# Patient Record
Sex: FEMALE | Race: OTHER | NOT HISPANIC OR LATINO | Employment: UNEMPLOYED | URBAN - METROPOLITAN AREA
[De-identification: names, ages, dates, MRNs, and addresses within clinical notes are randomized per-mention and may not be internally consistent; named-entity substitution may affect disease eponyms.]

---

## 2019-04-29 ENCOUNTER — APPOINTMENT (OUTPATIENT)
Dept: URGENT CARE | Facility: CLINIC | Age: 59
End: 2019-04-29

## 2019-04-29 ENCOUNTER — OCCUPATIONAL MEDICINE (OUTPATIENT)
Dept: URGENT CARE | Facility: CLINIC | Age: 59
End: 2019-04-29
Payer: COMMERCIAL

## 2019-04-29 VITALS
HEART RATE: 74 BPM | RESPIRATION RATE: 16 BRPM | BODY MASS INDEX: 21.97 KG/M2 | SYSTOLIC BLOOD PRESSURE: 128 MMHG | WEIGHT: 140 LBS | HEIGHT: 67 IN | DIASTOLIC BLOOD PRESSURE: 88 MMHG | TEMPERATURE: 98 F | OXYGEN SATURATION: 98 %

## 2019-04-29 DIAGNOSIS — S29.019A STRAIN OF THORACIC SPINE, INITIAL ENCOUNTER: ICD-10-CM

## 2019-04-29 DIAGNOSIS — Z02.1 PRE-EMPLOYMENT DRUG SCREENING: ICD-10-CM

## 2019-04-29 LAB
BREATH ALCOHOL COMMENT: NORMAL
POC BREATHALIZER: 0 PERCENT (ref 0–0.01)

## 2019-04-29 PROCEDURE — 80305 DRUG TEST PRSMV DIR OPT OBS: CPT | Mod: 29 | Performed by: PHYSICIAN ASSISTANT

## 2019-04-29 PROCEDURE — 82075 ASSAY OF BREATH ETHANOL: CPT | Mod: 29 | Performed by: PHYSICIAN ASSISTANT

## 2019-04-29 PROCEDURE — 99214 OFFICE O/P EST MOD 30 MIN: CPT | Mod: 29 | Performed by: PHYSICIAN ASSISTANT

## 2019-04-29 RX ORDER — METHOCARBAMOL 750 MG/1
750 TABLET, FILM COATED ORAL 4 TIMES DAILY
Qty: 30 TAB | Refills: 0 | Status: SHIPPED | OUTPATIENT
Start: 2019-04-29 | End: 2020-05-19

## 2019-04-29 ASSESSMENT — ENCOUNTER SYMPTOMS
FALLS: 0
TINGLING: 0

## 2019-04-29 NOTE — LETTER
"EMPLOYEE’S CLAIM FOR COMPENSATION/ REPORT OF INITIAL TREATMENT  FORM C-4    EMPLOYEE’S CLAIM - PROVIDE ALL INFORMATION REQUESTED   First Name  Alida Last Name  Filiberto Birthdate                    1960                Sex  female Claim Number   Home Address  [address] Age  59 y.o. Height  1.702 m (5' 7\") Weight  63.5 kg (140 lb) [facility]     [facility] [place]  [address] Telephone  [phone] (home)    Mailing Address  [address] [facility] [place]  [address] Primary Language Spoken  English    Insurer   Third Party   Greta Rush   Employee's Occupation (Job Title) When Injury or Occupational Disease Occurred      Employer's Name  [employer]  Telephone  [phone]    Employer Address  [address]  [place]  Zip  [address]    Date of Injury  4/25/2019               Hour of Injury  12:00 PM Date Employer Notified  4/29/2019 Last Day of Work after Injury or Occupational Disease  4/29/2019 Supervisor to Whom Injury Reported  Edmond/Ju   Address or Location of Accident (if applicable)  [[address] ]   What were you doing at the time of accident? (if applicable)  Lifting 40 lbs box    How did this injury or occupational disease occur? (Be specific an answer in detail. Use additional sheet if necessary)  boxx at bottom of crate,lifted it back (upper right) starting stabing pain   If you believe that you have an occupational disease, when did you first have knowledge of the disability and it relationship to your employment?  n/a Witnesses to the Accident  Co workers      Nature of Injury or Occupational Disease  Workers' Compensation  Part(s) of Body Injured or Affected  Upper Back Area (Thoracic Area), Abdomen Including Groin,     I certify that the above is true and correct to the best of my knowledge and that I have provided this " information in order to obtain the benefits of [place]  I hereby authorize any physician, chiropractor, surgeon, practitioner, or other person, any hospital, including [facility], any medical service organization, any insurance company, or other institution or organization to release to each other, any medical or other information, including benefits paid or payable, pertinent to this injury or disease, except information relative to diagnosis, treatment and/or counseling for AIDS, psychological conditions, alcohol or controlled substances, for which I must give specific authorization.  A Photostat of this authorization shall be as valid as the original.     Date 4/29/19   Place [facility]   Employee’s Signature   THIS REPORT MUST BE COMPLETED AND MAILED WITHIN 3 WORKING DAYS OF TREATMENT   Place  [facility]  Name of Facility  [facility]   Date  4/29/2019 Diagnosis  (S29.019A) Strain of thoracic spine, initial encounter Is there evidence the injured employee was under the influence of alcohol and/or another controlled substance at the time of accident?   Hour  4:57 PM Description of Injury or Disease  The encounter diagnosis was Strain of thoracic spine, initial encounter. No   Treatment  Rest, NSAIDs, heat, stretching.  Muscle relaxers as needed.  Have you advised the patient to remain off work five days or more? No   X-Ray Findings      If Yes   From Date  To Date      From information given by the employee, together with medical evidence, can you directly connect this injury or occupational disease as job incurred?  Yes If No Full Duty  No Modified Duty  Yes   Is additional medical care by a physician indicated?  Yes If Modified Duty, Specify any Limitations / Restrictions  No use of right upper extremity.  No lifting loads heavier than 10 pounds.   "No stooping, bending, pushing, pulling, or climbing.   Do you know of any previous injury or disease contributing to this condition or occupational disease?                            No   Date  4/29/2019 Print Doctor’s Name Malaika Urrutia P.A.-C. I certify the employer’s copy of  this form was mailed on:   Address  [address] Insurer’s Use Only     [place] Zip  [address]    Provider’s Tax ID Number  [id number] Telephone  Dept: [phone]        e-MALAIKA Camilo P.A.-C.   e-Signature: Dr. Shimon Catalan, Medical Director Degree           ORIGINAL-TREATING PHYSICIAN OR CHIROPRACTOR    PAGE 2-INSURER/TPA    PAGE 3-EMPLOYER    PAGE 4-EMPLOYEE             Form C-4 (rev10/07)              BRIEF DESCRIPTION OF RIGHTS AND BENEFITS  (Pursuant to [place]RS 616C.050)    Notice of Injury or Occupational Disease (Incident Report Form C-1): If an injury or occupational disease (OD) arises out of and in the  course of employment, you must provide written notice to your employer as soon as practicable, but no later than 7 days after the accident or  OD. Your employer shall maintain a sufficient supply of the required forms.    Claim for Compensation (Form C-4): If medical treatment is sought, the form C-4 is available at the place of initial treatment. A completed  \"Claim for Compensation\" (Form C-4) must be filed within 90 days after an accident or OD. The treating physician or chiropractor must,  within 3 working days after treatment, complete and mail to the employer, the employer's insurer and third-party , the Claim for  Compensation.    Medical Treatment: If you require medical treatment for your on-the-job injury or OD, you may be required to select a physician or  chiropractor from a list provided by your workers’ compensation insurer, if it has contracted with an Organization for Managed Care (MCO) or  Preferred Provider Organization (PPO) or providers of health care. If your employer has " not entered into a contract with an MCO or PPO, you  may select a physician or chiropractor from the Panel of Physicians and Chiropractors. Any medical costs related to your industrial injury or  OD will be paid by your insurer.    Temporary Total Disability (TTD): If your doctor has certified that you are unable to work for a period of at least 5 consecutive days, or 5  cumulative days in a 20-day period, or places restrictions on you that your employer does not accommodate, you may be entitled to TTD  compensation.    Temporary Partial Disability (TPD): If the wage you receive upon reemployment is less than the compensation for TTD to which you are  entitled, the insurer may be required to pay you TPD compensation to make up the difference. TPD can only be paid for a maximum of 24  months.    Permanent Partial Disability (PPD): When your medical condition is stable and there is an indication of a PPD as a result of your injury or  OD, within 30 days, your insurer must arrange for an evaluation by a rating physician or chiropractor to determine the degree of your PPD. The  amount of your PPD award depends on the date of injury, the results of the PPD evaluation and your age and wage.    Permanent Total Disability (PTD): If you are medically certified by a treating physician or chiropractor as permanently and totally disabled  and have been granted a PTD status by your insurer, you are entitled to receive monthly benefits not to exceed 66 2/3% of your average  monthly wage. The amount of your PTD payments is subject to reduction if you previously received a PPD award.    Vocational Rehabilitation Services: You may be eligible for vocational rehabilitation services if you are unable to return to the job due to a  permanent physical impairment or permanent restrictions as a result of your injury or occupational disease.    Transportation and Per Sae Reimbursement: You may be eligible for travel expenses and per sae  associated with medical treatment.    Reopening: You may be able to reopen your claim if your condition worsens after claim closure.    Appeal Process: If you disagree with a written determination issued by the insurer or the insurer does not respond to your request, you may  appeal to the Department of Administration, , by following the instructions contained in your determination letter. You must  appeal the determination within 70 days from the date of the determination letter at [address], [address], [place], [place]  [address], or [address], [place], [place] [address]. If you disagree with the  decision, you may appeal to the  Department of Administration, . You must file your appeal within 30 days from the date of the  decision  letter at [address], [address], [place], [place] [address], or [address], [place], [place] [address]. If you  disagree with a decision of an , you may file a petition for judicial review with the District Court. You must do so within 30  days of the Appeal Officer’s decision. You may be represented by an  at your own expense or you may contact the [facility] for possible  representation.    [place]  for Injured Workers (NAIW): If you disagree with a  decision, you may request that NAIW represent you  without charge at an  Hearing. For information regarding denial of benefits, you may contact the [facility] at: [address], [place] [address], [phone], or [address], [place] [address], [phone]    To File a Complaint with the Division: If you wish to file a complaint with the  of the Division of Industrial Relations (DIR),  please contact the Workers’ Compensation Section, [address], [address], [place], [place] [address], telephone  ([phone], or  [address], [address], [place], [place] [address], telephone [phone].    For assistance with Workers’ Compensation Issues: you may contact the Office of the Governor Consumer Health Assistance, [address] [place] [address], [address], [place], [place] [address], Toll Free 1-477.182.6854, Web site: [email or web address]., E-mail  [email or web address].                                                                                                                                                                                                                                   __________________________________________________________________                                                                   _____4/29/19____________                Employee Name / Signature                                                                                                                                                       Date                                                                                                                                                                                                     D-2 (rev. 10/07)

## 2019-04-29 NOTE — LETTER
[facility]  [address] [address]  Phone:  [phone] - Fax:  [phone]   [facility] Progress Report and Disability Certification  Date of Service: 4/29/2019   No Show:  No  Date / Time of Next Visit: 5/3/2019   Claim Information   Patient Name: Alida De Los Santos  Claim Number:     Employer: [employer]  Date of Injury: 4/25/2019     Insurer / TPA: Greta [place]  ID / SSN:     Occupation:   Diagnosis: The encounter diagnosis was Strain of thoracic spine, initial encounter.    Medical Information   Related to Industrial Injury?      Subjective Complaints:  Date of Injury:  4/25/2019. Pt complains of right upper back pain x 7 days.  Symptoms began when she was moving a 40 pound box off the bottom of a crate.  She felt a sudden sharp pulling in her right shoulder.  Pain is not present at rest, but is sharp with movement.  She states that she was seen in a different urgent care several days ago and given an injection of Toradol and started on oral Toradol.  She is developing a rash with this medication.  She does not tolerate other oral NSAIDs as she has had GI bleeding in the past.  She has applied heat with significant relief.  She denies distal numbness and tingling, weakness, restricted range of motion.  She is right-hand dominant.  Denies prior injury to the affected area.   Objective Findings: No midline bony tenderness.  No step-offs or deformities.  Right thoracic spine paraspinals are acutely tender to palpation with palpable spasm.  Right shoulder range of motion is within normal limits.  Cervical spine range of motion within normal limits.   Neurological: She is alert. No sensory deficit.   Neurovascularly intact distally.   Skin: Skin is warm and dry. Capillary refill takes less than 2 seconds.    Pre-Existing Condition(s):     Assessment:   Initial Visit    Status: Additional Care Required  Permanent  Disability:     Plan: MedicationMedication (NOT at Work)  Comments:topical diclofenac, APAP, robaxin    Diagnostics:      Comments:  Topical diclofenac and Tylenol as needed for pain.  Robaxin for muscle spasm.  Apply heat and perform gentle stretches as instructed in clinic.  Return to clinic in 4 days for reevaluation.    Disability Information   Status: Released to Restricted Duty    From:  4/29/2019  Through: 5/3/2019 Restrictions are:     Physical Restrictions   Sitting:    Standing:    Stooping:    Bending:      Squatting:    Walking:    Climbing:    Pushing:      Pulling:    Other:    Reaching Above Shoulder (L):   Reaching Above Shoulder (R):       Reaching Below Shoulder (L):    Reaching Below Shoulder (R):      Not to exceed Weight Limits   Carrying(hrs):   Weight Limit(lb): < or = to 10 pounds Lifting(hrs):   Weight  Limit(lb): < or = to 10 pounds   Comments: No use of right upper extremity except for activities of daily living.  No lifting loads heavier than 10 pounds.  No stooping, bending, pushing, pulling, or climbing.    Repetitive Actions   Hands: i.e. Fine Manipulations from Grasping:     Feet: i.e. Operating Foot Controls:     Driving / Operate Machinery:     Physician Name: Malaika Urrutia P.A.-C. Physician Signature: MALAIKA Parker P.A.-C. e-Signature: Dr. Shimon Catalan, Medical Director   Clinic Name / Location: [address], [address]  [facility] [place] [address] Clinic Phone Number: Dept: [phone]   Appointment Time: 4:30 Pm Visit Start Time: 4:57 PM   Check-In Time:  4:34 Pm Visit Discharge Time: 5:14 Pm    Original-Treating Physician or Chiropractor    Page 2-Insurer/TPA    Page 3-Employer    Page 4-Employee

## 2019-04-30 LAB
AMP AMPHETAMINE: NORMAL
COC COCAINE: NORMAL
INT CON NEG: NORMAL
INT CON POS: NORMAL
MET METHAMPHETAMINES: NORMAL
OPI OPIATES: NORMAL
PCP PHENCYCLIDINE: NORMAL
POC DRUG COMMENT 753798-OCCUPATIONAL HEALTH: NORMAL
THC: NORMAL

## 2019-04-30 NOTE — PROGRESS NOTES
Subjective:   Alida De Los Santos is a 59 y.o. female who presents for Back Pain (WC New, Lift injury, upper back pain)        Date of Injury:  4/25/2019. Pt complains of right upper back pain x 7 days.  Symptoms began when she was moving a 40 pound box off the bottom of a crate.  She felt a sudden sharp pulling in her right shoulder.  Pain is not present at rest, but is sharp with movement.  She states that she was seen in a different urgent care several days ago and given an injection of Toradol and started on oral Toradol.  She is developing a rash with this medication.  She does not tolerate other oral NSAIDs as she has had GI bleeding in the past.  She has applied heat with significant relief.  She denies distal numbness and tingling, weakness, restricted range of motion.  She is right-hand dominant.  Denies prior injury to the affected area.                    Review of Systems   Musculoskeletal: Positive for joint pain. Negative for falls.   Neurological: Negative for tingling.       PMH:  has a past medical history of Allergy, unspecified not elsewhere classified; Degenerative disc disease; Depression; and Thyroid disease. She also has no past medical history of GERD (gastroesophageal reflux disease); IBD (inflammatory bowel disease); Kidney disease; or Ulcer.  MEDS:   Current Outpatient Prescriptions:   •  Diclofenac Sodium 1 % Gel, Apply 2 g to skin as directed 2 Times a Day., Disp: 1 Tube, Rfl: 0  •  methocarbamol (ROBAXIN) 750 MG Tab, Take 1 Tab by mouth 4 times a day., Disp: 30 Tab, Rfl: 0  •  Levothyroxine Sodium (SYNTHROID PO), Take  by mouth., Disp: , Rfl:   •  Vortioxetine HBr (BRINTELLIX) 10 MG TABS, Take  by mouth., Disp: , Rfl:   •  methylPREDNISolone (MEDROL DOSPACK) 4 MG TABS, follow package directions, Disp: 1 Tab, Rfl: 0  •  fluticasone (FLONASE) 50 MCG/ACT nasal spray, Spray 2 Sprays in nose every day., Disp: 16 g, Rfl: 0  •  LITHIUM CARBONATE PO, Take  by mouth., Disp: , Rfl:   •  lorazepam  "(ATIVAN) 1 MG TABS, Take 1 mg by mouth every four hours as needed., Disp: , Rfl:   •  fluticasone (FLONASE) 50 MCG/ACT nasal spray, Spray 2 Sprays in nose every day. Each Nostril, Disp: 16 g, Rfl: 3  ALLERGIES:   Allergies   Allergen Reactions   • Codeine    • Nexium      SURGHX: No past surgical history on file.  SOCHX:  reports that she has never smoked. She has never used smokeless tobacco. She reports that she does not drink alcohol or use drugs.  FH: Family history was reviewed, no pertinent findings to report   Objective:   /88   Pulse 74   Temp 36.7 °C (98 °F) (Temporal)   Resp 16   Ht 1.702 m (5' 7\")   Wt 63.5 kg (140 lb)   SpO2 98%   BMI 21.93 kg/m²   Physical Exam   Constitutional: She appears well-developed and well-nourished.  Non-toxic appearance. No distress.   HENT:   Head: Normocephalic and atraumatic.   Right Ear: External ear normal.   Left Ear: External ear normal.   Nose: Nose normal.   Neck: Neck supple.   Cardiovascular: Normal rate and regular rhythm.    Pulses:       Radial pulses are 2+ on the right side.   Pulmonary/Chest: Effort normal. No respiratory distress.   Musculoskeletal:        Arms:  No midline bony tenderness.  No step-offs or deformities.  Right thoracic spine paraspinals are acutely tender to palpation with palpable spasm.  Right shoulder range of motion is within normal limits.  Cervical spine range of motion within normal limits.   Neurological: She is alert. No sensory deficit.   Neurovascularly intact distally.   Skin: Skin is warm and dry. Capillary refill takes less than 2 seconds.   Psychiatric: She has a normal mood and affect. Her speech is normal and behavior is normal. Judgment and thought content normal. Cognition and memory are normal.   Vitals reviewed.        Assessment/Plan:   1. Strain of thoracic spine, initial encounter  - Diclofenac Sodium 1 % Gel; Apply 2 g to skin as directed 2 Times a Day.  Dispense: 1 Tube; Refill: 0  - methocarbamol " (ROBAXIN) 750 MG Tab; Take 1 Tab by mouth 4 times a day.  Dispense: 30 Tab; Refill: 0     No red flag symptoms.  No bony tenderness or significant trauma.  Radiological imaging not indicated at this time.    Patient instructed to rest and avoid aggravating activities.  Apply warm, damp heat to the affected area and perform gentle stretches as instructed in clinic.  As symptoms improve patient may increase activity.    Topical diclofenac and Tylenol as needed.  Robaxin for muscle spasm.  Heat and gentle stretching    If symptoms worsen or fail to improve patient instructed to follow-up with PCP or return to clinic for reevaluation.  If patient develops red flag symptoms such as urinary retention, loss of bowel or bladder control, perianal numbness or tingling, lower extremity weakness-patient was instructed to go to the ED for further evaluation.    Differential diagnosis, natural history, supportive care, and indications for immediate follow-up discussed.

## 2019-05-03 ENCOUNTER — OCCUPATIONAL MEDICINE (OUTPATIENT)
Dept: URGENT CARE | Facility: CLINIC | Age: 59
End: 2019-05-03
Payer: COMMERCIAL

## 2019-05-03 VITALS
BODY MASS INDEX: 28.82 KG/M2 | SYSTOLIC BLOOD PRESSURE: 130 MMHG | HEIGHT: 67 IN | HEART RATE: 90 BPM | WEIGHT: 183.6 LBS | RESPIRATION RATE: 16 BRPM | OXYGEN SATURATION: 98 % | TEMPERATURE: 98.7 F | DIASTOLIC BLOOD PRESSURE: 84 MMHG

## 2019-05-03 DIAGNOSIS — S29.019D ACUTE THORACIC MYOFASCIAL STRAIN, SUBSEQUENT ENCOUNTER: ICD-10-CM

## 2019-05-03 PROCEDURE — 99213 OFFICE O/P EST LOW 20 MIN: CPT | Performed by: EMERGENCY MEDICINE

## 2019-05-03 RX ORDER — CELECOXIB 200 MG/1
200 CAPSULE ORAL
Qty: 14 CAP | Refills: 0 | Status: SHIPPED | OUTPATIENT
Start: 2019-05-03 | End: 2019-05-10

## 2019-05-03 NOTE — PROGRESS NOTES
Subjective:      Alida De Los Santos is a 59 y.o. female who presents with Back Pain (MId back pain)      Date of injury: 04/25/2019.  Injured at work: yes; onset of right periscapular pain while lifting a box. Previous injury: none. Second job: none. Outside activity: none. Contributing medical illness: none. Severity: moderate, severe. Prior treatment:  Heat, topical NSAID, muscle relaxant, occasional Tylenol. Location: right upper back. Radiation: Right shoulder, right torso. Numbness/tingling: none. Focal weakness: none.     HPI    ROS  PMH:  has a past medical history of Allergy, unspecified not elsewhere classified; Degenerative disc disease; Depression; and Thyroid disease. She also has no past medical history of GERD (gastroesophageal reflux disease); IBD (inflammatory bowel disease); Kidney disease; or Ulcer.  MEDS:   Current Outpatient Prescriptions:   •  Levothyroxine Sodium (SYNTHROID PO), Take  by mouth., Disp: , Rfl:   •  fluticasone (FLONASE) 50 MCG/ACT nasal spray, Spray 2 Sprays in nose every day. Each Nostril, Disp: 16 g, Rfl: 3  •  Diclofenac Sodium 1 % Gel, Apply 2 g to skin as directed 2 Times a Day., Disp: 1 Tube, Rfl: 0  •  methocarbamol (ROBAXIN) 750 MG Tab, Take 1 Tab by mouth 4 times a day., Disp: 30 Tab, Rfl: 0  •  Vortioxetine HBr (BRINTELLIX) 10 MG TABS, Take  by mouth., Disp: , Rfl:   •  methylPREDNISolone (MEDROL DOSPACK) 4 MG TABS, follow package directions, Disp: 1 Tab, Rfl: 0  •  fluticasone (FLONASE) 50 MCG/ACT nasal spray, Spray 2 Sprays in nose every day., Disp: 16 g, Rfl: 0  •  LITHIUM CARBONATE PO, Take  by mouth., Disp: , Rfl:   •  lorazepam (ATIVAN) 1 MG TABS, Take 1 mg by mouth every four hours as needed., Disp: , Rfl:   ALLERGIES:   Allergies   Allergen Reactions   • Codeine    • Nexium      SURGHX: History reviewed. No pertinent surgical history.  SOCHX:  reports that she has never smoked. She has never used smokeless tobacco. She reports that she does not drink alcohol or  "use drugs.  FH: family history includes Non-contributory in her father and mother.       Objective:     /84 (BP Location: Left arm, Patient Position: Sitting, BP Cuff Size: Large adult)   Pulse 90   Temp 37.1 °C (98.7 °F) (Temporal)   Resp 16   Ht 1.702 m (5' 7\")   Wt 83.3 kg (183 lb 9.6 oz)   SpO2 98%   BMI 28.76 kg/m²      Physical Exam    General: Alert, cooperative, no acute distress.  Torso: Normal respiratory effort, no rib tenderness.  Musculoskeletal: Right upper thoracic, right medial periscapular muscle tenderness, focal spasm.  Right shoulder rotator cuff function intact, no joint tenderness.  Vascular: Bilateral radial pulses intact.  Neurological: Bilateral upper extremity motor, sensory function intact.  Skin: Warm, dry, intact       Assessment/Plan:     1. Acute thoracic myofascial strain, subsequent encounter  D39 completed.  Discontinue muscle relaxant, topical NSAID.  Advised alternating ice/heat, OTC topical analgesic patch as needed.  Rx Mobic.      "

## 2019-05-03 NOTE — LETTER
[facility]  [address], [address] [address]  Phone:  [phone] - Fax:  [phone]   [facility] Progress Report and Disability Certification  Date of Service: 5/3/2019   No Show:  No  Date / Time of Next Visit: 5/10/2019   Claim Information   Patient Name: Alida De Los Santos  Claim Number:     Employer: [employer]  Date of Injury: 4/25/2019     Insurer / TPA: Greta Bolivar  ID / SSN:     Occupation:   Diagnosis: The encounter diagnosis was Acute thoracic myofascial strain, subsequent encounter.    Medical Information   Related to Industrial Injury? Yes    Subjective Complaints:  Date of injury: 04/25/2019.  Injured at work: yes; onset of right periscapular pain while lifting a box. Previous injury: none. Second job: none. Outside activity: none. Contributing medical illness: none. Severity: moderate, severe. Prior treatment:  Heat, topical NSAID, muscle relaxant, occasional Tylenol. Location: right upper back. Radiation: Right shoulder, right torso. Numbness/tingling: none. Focal weakness: none.   Objective Findings: General: Alert, cooperative, no acute distress.  Torso: Normal respiratory effort, no rib tenderness.  Musculoskeletal: Right upper thoracic, right medial periscapular muscle tenderness, focal spasm.  Right shoulder rotator cuff function intact, no joint tenderness.  Vascular: Bilateral radial pulses intact.  Neurological: Bilateral upper extremity motor, sensory function intact.  Skin: Warm, dry, intact   Pre-Existing Condition(s):     Assessment:   Condition Same    Status: Additional Care Required  Permanent Disability:No    Plan: Medication    Diagnostics:      Comments:       Disability Information   Status: Released to Restricted Duty    From:  5/3/2019  Through: 5/10/2019 Restrictions are: Temporary   Physical Restrictions   Sitting:    Standing:    Stooping:    Bending:      Squatting:    Walking:   Climbing:    Pushing:    Comments:Left arm only   Pulling:    Comments:Left arm only Other:    Reaching Above Shoulder (L):   Reaching Above Shoulder (R): 0 hrs/day     Reaching Below Shoulder (L):    Reaching Below Shoulder (R):  < or = to 1 hrs/day   Not to exceed Weight Limits   Carrying(hrs):   Weight Limit(lb): < or = to 10 pounds Lifting(hrs):   Weight  Limit(lb): < or = to 10 pounds   Comments:      Repetitive Actions   Hands: i.e. Fine Manipulations from Grasping:     Feet: i.e. Operating Foot Controls:     Driving / Operate Machinery:     Physician Name: Gavin Wheeler M.D. Physician Signature: GAVIN Deleon M.D. e-Signature: Dr. Shimon Catalan, Medical Director   Clinic Name / Location: [address], [address] [place] [address] Clinic Phone Number: Dept: [phone]   Appointment Time: 11:00 Am Visit Start Time: 10:16 AM   Check-In Time:  10:12 Am Visit Discharge Time:  11:05am   Original-Treating Physician or Chiropractor    Page 2-Insurer/TPA    Page 3-Employer    Page 4-Employee

## 2019-05-10 ENCOUNTER — OCCUPATIONAL MEDICINE (OUTPATIENT)
Dept: URGENT CARE | Facility: CLINIC | Age: 59
End: 2019-05-10
Payer: COMMERCIAL

## 2019-05-10 VITALS
OXYGEN SATURATION: 98 % | WEIGHT: 181 LBS | HEART RATE: 72 BPM | RESPIRATION RATE: 16 BRPM | SYSTOLIC BLOOD PRESSURE: 134 MMHG | BODY MASS INDEX: 28.41 KG/M2 | DIASTOLIC BLOOD PRESSURE: 82 MMHG | TEMPERATURE: 98 F | HEIGHT: 67 IN

## 2019-05-10 DIAGNOSIS — S29.019D ACUTE THORACIC MYOFASCIAL STRAIN, SUBSEQUENT ENCOUNTER: ICD-10-CM

## 2019-05-10 PROCEDURE — 99214 OFFICE O/P EST MOD 30 MIN: CPT | Mod: 29 | Performed by: PHYSICIAN ASSISTANT

## 2019-05-10 ASSESSMENT — ENCOUNTER SYMPTOMS
BACK PAIN: 1
MYALGIAS: 1
TINGLING: 0
FOCAL WEAKNESS: 0
SENSORY CHANGE: 0

## 2019-05-10 NOTE — PROGRESS NOTES
"Subjective:      Alida De Los Santos is a 59 y.o. female who presents with Employment Physical    Pt PMH, SocHx, SurgHx, FamHx, Drug allergies and medications reviewed with pt/EPIC.      Family history reviewed, it is not pertinent to this complaint.     DOI: 4/25/2019.  Patient presents clinic today for follow-up evaluation of right upper back strain that occurred while lifting a 40 pound box at work.  Patient states she is slowly improving over the course of the last week.  Patient states pain was 8 out of the 10 in the beginning now is 6 out of a 10.  Patient states she has been able to raise her range of motion in her shoulder while doing her exercises at home.  Patient has been doing over-the-counter NSAIDs as well as topical salon pas, heat and cool packs as well.  Patient denies numbness or tingling to right hand.  Denies previous injury to this area.  Denies second job.  Patient is right-handed.     Work comp follow up.         Review of Systems   Musculoskeletal: Positive for back pain and myalgias.   Neurological: Negative for tingling, sensory change and focal weakness.   All other systems reviewed and are negative.         Objective:     /82 (BP Location: Left arm, Patient Position: Sitting, BP Cuff Size: Large adult)   Pulse 72   Temp 36.7 °C (98 °F) (Temporal)   Resp 16   Ht 1.702 m (5' 7\")   Wt 82.1 kg (181 lb)   SpO2 98%   BMI 28.35 kg/m²      Physical Exam   Constitutional: She appears well-developed and well-nourished.   Cardiovascular: Normal rate and intact distal pulses.    Pulmonary/Chest: Effort normal.   Nursing note and vitals reviewed.      Physical exam: Right upper thoracic muscle spasm and tenderness to palpation to paraspinous area across to medial periscapular area.  Patient continues to have decreased range of motion secondary to pain of right shoulder.  Patient can complete internal and external rotation with arms hanging down, pain with abduction above shoulder level.  " Patient is unable to do any external rotation above shoulder level for example cannot put her hand behind her head.   strength 5 out of 5.  Distal neurovascular is intact to right upper extremity.  Skin is normal in color and temperature.       Assessment/Plan:     1. Acute thoracic myofascial strain, subsequent encounter       Follow D-39 instructions/restrictions. Return to clinic as scheduled.     I spoke with Samia Navarro from [facility] who is aware of patient's D 39 restrictions and follow-up appointment.

## 2019-05-10 NOTE — LETTER
[facility]  [address] [facility], [address] [address]  Phone:  [phone] - Fax:  [phone]   [facility] Progress Report and Disability Certification  Date of Service: 5/10/2019   No Show:  No  Date / Time of Next Visit: 5/17/2019   Claim Information   Patient Name: Alida De Los Santos  Claim Number:     Employer: [employer]  Date of Injury: 4/25/2019     Insurer / TPA: Greta Bedford  ID / SSN:     Occupation:   Diagnosis: The encounter diagnosis was Acute thoracic myofascial strain, subsequent encounter.    Medical Information   Related to Industrial Injury? Yes    Subjective Complaints:  DOI: 4/25/2019.  Patient presents clinic today for follow-up evaluation of right upper back strain that occurred while lifting a 40 pound box at work.  Patient states she is slowly improving over the course of the last week.  Patient states pain was 8 out of the 10 in the beginning now is 6 out of a 10.  Patient states she has been able to raise her range of motion in her shoulder while doing her exercises at home.  Patient has been doing over-the-counter NSAIDs as well as topical salon pas, heat and cool packs as well.  Patient denies numbness or tingling to right hand.  Denies previous injury to this area.  Denies second job.  Patient is right-handed.   Objective Findings: Physical exam: Right upper thoracic muscle spasm and tenderness to palpation to paraspinous area across to medial periscapular area.  Patient continues to have decreased range of motion secondary to pain of right shoulder.  Patient can complete internal and external rotation with arms hanging down, pain with abduction above shoulder level.  Patient is unable to do any external rotation above shoulder level for example cannot put her hand behind her head.   strength 5 out of 5.  Distal neurovascular is intact to right upper extremity.  Skin is normal in color and  temperature.   Pre-Existing Condition(s):     Assessment:   Condition Improved    Status: Additional Care Required  Permanent Disability:No    Plan:      Diagnostics:      Comments:       Disability Information   Status: Released to Restricted Duty    From:  5/10/2019  Through: 5/17/2019 Restrictions are: Temporary   Physical Restrictions   Sitting:    Standing:    Stooping:    Bending:      Squatting:    Walking:    Climbing:    Pushing:      Pulling:    Other:    Reaching Above Shoulder (L):   Reaching Above Shoulder (R): 0 hrs/day     Reaching Below Shoulder (L):    Reaching Below Shoulder (R):  < or = to 1 hrs/day   Not to exceed Weight Limits   Carrying(hrs):   Weight Limit(lb): < or = to 10 pounds Lifting(hrs):   Weight  Limit(lb): < or = to 10 pounds   Comments: Patient to continue restrictions as designated, OTC medications as discussed, topical salon pas, and warm and cold compresses as instructed.  Patient to follow-up in 1 week.  If improvement is still limited, patient likely needs to be seen by occupational health after next visit.    Repetitive Actions   Hands: i.e. Fine Manipulations from Grasping:     Feet: i.e. Operating Foot Controls:     Driving / Operate Machinery:     Physician Name: Steven Zarate P.A.-C. Physician Signature: STEVEN Valerio P.A.-C. e-Signature: Dr. Shimon Catalan, Medical Director   Clinic Name / Location: [address], [address]  [place] [address] Clinic Phone Number: Dept: [phone]   Appointment Time: 10:45 Am Visit Start Time: 10:40 AM   Check-In Time:  9:53 Am Visit Discharge Time:  11:32 AM   Original-Treating Physician or Chiropractor    Page 2-Insurer/TPA    Page 3-Employer    Page 4-Employee

## 2019-05-17 ENCOUNTER — OCCUPATIONAL MEDICINE (OUTPATIENT)
Dept: URGENT CARE | Facility: CLINIC | Age: 59
End: 2019-05-17
Payer: COMMERCIAL

## 2019-05-17 VITALS
BODY MASS INDEX: 28.88 KG/M2 | TEMPERATURE: 98.1 F | SYSTOLIC BLOOD PRESSURE: 130 MMHG | HEART RATE: 76 BPM | RESPIRATION RATE: 16 BRPM | OXYGEN SATURATION: 97 % | WEIGHT: 184 LBS | HEIGHT: 67 IN | DIASTOLIC BLOOD PRESSURE: 80 MMHG

## 2019-05-17 DIAGNOSIS — S29.019D ACUTE THORACIC MYOFASCIAL STRAIN, SUBSEQUENT ENCOUNTER: ICD-10-CM

## 2019-05-17 PROCEDURE — 99214 OFFICE O/P EST MOD 30 MIN: CPT | Mod: 29 | Performed by: PHYSICIAN ASSISTANT

## 2019-05-17 RX ORDER — METHYLPREDNISOLONE 4 MG/1
TABLET ORAL
Qty: 21 TAB | Refills: 0 | Status: SHIPPED | OUTPATIENT
Start: 2019-05-17 | End: 2020-05-19

## 2019-05-17 NOTE — PROGRESS NOTES
Chief Complaint   Patient presents with   • Back Pain     Lower back injury        HISTORY OF PRESENT ILLNESS: Patient is a 59 y.o. female who presents today for the following:    DOI: 4/25/2019, 5th visit.    Patient reports right upper back strain that occurred while lifting a 40 pound box at work.  Patient states she continues to slowly improve since her last visit here.  Patient states pain was 8 out of the 10 in the beginning now is 6-8 out of a 10.  Patient states she is finally able to wash her hair without severe pain.  Patient has been doing over-the-counter NSAIDs as well as topical salon pas, heat and cool packs as well.  Patient denies numbness or tingling to right hand.  Denies previous injury to this area.  Denies second job.  Patient is right-handed.         There are no active problems to display for this patient.      Allergies:Codeine and Nexium    Current Outpatient Prescriptions Ordered in [facility]   Medication Sig Dispense Refill   • MethylPREDNISolone (MEDROL DOSEPAK) 4 MG Tablet Therapy Pack Use as package directs 21 Tab 0   • Diclofenac Sodium 1 % Gel Apply 2 g to skin as directed 2 Times a Day. 1 Tube 0   • methocarbamol (ROBAXIN) 750 MG Tab Take 1 Tab by mouth 4 times a day. 30 Tab 0   • fluticasone (FLONASE) 50 MCG/ACT nasal spray Spray 2 Sprays in nose every day. 16 g 0   • Levothyroxine Sodium (SYNTHROID PO) Take  by mouth.     • lorazepam (ATIVAN) 1 MG TABS Take 1 mg by mouth every four hours as needed.       No current Epic-ordered facility-administered medications on file.        Past Medical History:   Diagnosis Date   • Allergy, unspecified not elsewhere classified    • Degenerative disc disease    • Depression    • Thyroid disease        Social History   Substance Use Topics   • Smoking status: Never Smoker   • Smokeless tobacco: Never Used   • Alcohol use No       Family Status   Relation Status   • Mo (Not Specified)   • Fa (Not Specified)     Family History   Problem Relation Age of  "Onset   • Non-contributory Mother    • Non-contributory Father        Review of Systems:   Constitutional ROS: No unexpected change in weight, No weakness, No fatigue  Back: Persistent pain in the right thoracic region.  Hematologic/Lymphatic ROS: No chills, No night sweats, No weight loss  Skin/Integumentary ROS: No edema, No evidence of rash, No itching      Exam:  /80 (BP Location: Left arm, Patient Position: Sitting, BP Cuff Size: Large adult)   Pulse 76   Temp 36.7 °C (98.1 °F) (Temporal)   Resp 16   Ht 1.702 m (5' 7\")   Wt 83.5 kg (184 lb)   SpO2 97%   General: Well developed, well nourished. No distress.  Pulmonary: Unlabored respiratory effort.    Back: Mild localized tenderness in the mid thoracic spine on the right side between the spine and the scapula.  Extremities: Full range of motion right upper extremity.  No motor or sensory deficit noted.  Neurologic: Grossly nonfocal. No facial asymmetry noted.  Skin: Warm, dry, good turgor. No rashes in visible areas.   Psych: Normal mood. Alert and oriented x3. Judgment and insight is normal.    Assessment/Plan:  Patient does report improvement since her previous visit here but continues to have 6-8/10 pain with movement.  Given patient's history of GI issues with persistent anti-inflammatories, will have patient stop meloxicam and try a short course of steroids to see if this helps improve her symptoms more efficiently.  Return to clinic in 1 week for reevaluation, sooner for any significant changes in symptoms.  Refer to D 39 for restrictions.  1. Acute thoracic myofascial strain, subsequent encounter  MethylPREDNISolone (MEDROL DOSEPAK) 4 MG Tablet Therapy Pack       "

## 2019-05-17 NOTE — LETTER
[facility]  [address] [facility], [address] [address]  Phone:  [phone] - Fax:  [phone]   [facility] Progress Report and Disability Certification  Date of Service: 5/17/2019   No Show:  No  Date / Time of Next Visit: 5/24/2019   Claim Information   Patient Name: Alida De Los Santos  Claim Number:     Employer: [employer]  Date of Injury: 4/25/2019     Insurer / TPA: Greta Ramona  ID / SSN:     Occupation:   Diagnosis: The encounter diagnosis was Acute thoracic myofascial strain, subsequent encounter.    Medical Information   Related to Industrial Injury? Yes    Subjective Complaints:  DOI: 4/25/2019, 5th visit.    Patient reports right upper back strain that occurred while lifting a 40 pound box at work.  Patient states she continues to slowly improve since her last visit here.  Patient states pain was 8 out of the 10 in the beginning now is 6-8 out of a 10.  Patient states she is finally able to wash her hair without severe pain.  Patient has been doing over-the-counter NSAIDs as well as topical salon pas, heat and cool packs as well.  Patient denies numbness or tingling to right hand.  Denies previous injury to this area.  Denies second job.  Patient is right-handed.    Objective Findings: Back: Mild localized tenderness in the mid thoracic spine on the right side between the spine and the scapula.  Extremities: Full range of motion right upper extremity.  No motor or sensory deficit noted.   Pre-Existing Condition(s):     Assessment:   Condition Improved    Status: Additional Care Required  Permanent Disability:No    Plan: Medication    Diagnostics:      Comments:  Assessment/Plan:  Patient does report improvement since her previous visit here but continues to have 6-8/10 pain with movement.  Given patient's history of GI issues with persistent anti-inflammatories, will have patient stop meloxicam and try a clint  rt course of  steroids to see if this helps improve her symptoms more efficiently.  Return to clinic in 1 week for reevaluation, sooner for any significant changes in symptoms.  Refer to D 39 for restrictions.  1. Acute thoracic myofascial strain, sub  sequent encounter  MethylPREDNISolone (MEDROL DOSEPAK) 4 MG Tablet Therapy Pack    Disability Information   Status: Released to Restricted Duty    From:  5/17/2019  Through: 5/24/2019 Restrictions are: Temporary   Physical Restrictions   Sitting:    Standing:    Stooping:    Bending:      Squatting:    Walking:    Climbing:    Pushing:      Pulling:    Other:    Reaching Above Shoulder (L):   Reaching Above Shoulder (R): 0 hrs/day     Reaching Below Shoulder (L):    Reaching Below Shoulder (R):  < or = to 1 hrs/day   Not to exceed Weight Limits   Carrying(hrs):   Weight Limit(lb): < or = to 10 pounds Lifting(hrs):   Weight  Limit(lb): < or = to 10 pounds   Comments: No lifting, carrying, pushing, or pulling more than 10 pounds    Repetitive Actions   Hands: i.e. Fine Manipulations from Grasping:     Feet: i.e. Operating Foot Controls:     Driving / Operate Machinery:     Physician Name: Claus Quintero P.A.-C. Physician Signature: CLAUS Bruce P.A.-C. e-Signature: Dr. Shimon Catalan, Medical Director   Clinic Name / Location: [address], [address] [place] [address] Clinic Phone Number: Dept: [phone]   Appointment Time: 8:00 Am Visit Start Time: 7:59 AM   Check-In Time:  7:56 Am Visit Discharge Time:  8:17 AM   Original-Treating Physician or Chiropractor    Page 2-Insurer/TPA    Page 3-Employer    Page 4-Employee

## 2019-05-24 ENCOUNTER — OCCUPATIONAL MEDICINE (OUTPATIENT)
Dept: URGENT CARE | Facility: CLINIC | Age: 59
End: 2019-05-24
Payer: COMMERCIAL

## 2019-05-24 VITALS
SYSTOLIC BLOOD PRESSURE: 132 MMHG | BODY MASS INDEX: 28.56 KG/M2 | HEART RATE: 80 BPM | HEIGHT: 67 IN | TEMPERATURE: 98.7 F | WEIGHT: 182 LBS | RESPIRATION RATE: 16 BRPM | DIASTOLIC BLOOD PRESSURE: 84 MMHG | OXYGEN SATURATION: 97 %

## 2019-05-24 DIAGNOSIS — S29.019D ACUTE THORACIC MYOFASCIAL STRAIN, SUBSEQUENT ENCOUNTER: ICD-10-CM

## 2019-05-24 PROCEDURE — 99214 OFFICE O/P EST MOD 30 MIN: CPT | Mod: 29 | Performed by: PHYSICIAN ASSISTANT

## 2019-05-24 NOTE — PROGRESS NOTES
Chief Complaint   Patient presents with   • Back Pain     lower back pain        HISTORY OF PRESENT ILLNESS: Patient is a 59 y.o. female who presents today for the following:    DOI: 4/25/2019, 6th visit.    Patient reports right upper back strain that occurred while lifting a 40 pound box at work.  Last week, prior to the course of steroids, she was having pain even at rest.  This week she denies pain at rest, only complaining of pain with movement of the right upper extremity.  She continues doing over-the-counter topical salon pas, heat and cool packs as well. Patient denies numbness or tingling to right hand.  Denies previous injury to this area.  Denies second job.  Patient is right-handed.     There are no active problems to display for this patient.      Allergies:Codeine and Nexium    Current Outpatient Prescriptions Ordered in [facility]   Medication Sig Dispense Refill   • MethylPREDNISolone (MEDROL DOSEPAK) 4 MG Tablet Therapy Pack Use as package directs 21 Tab 0   • Diclofenac Sodium 1 % Gel Apply 2 g to skin as directed 2 Times a Day. 1 Tube 0   • methocarbamol (ROBAXIN) 750 MG Tab Take 1 Tab by mouth 4 times a day. 30 Tab 0   • fluticasone (FLONASE) 50 MCG/ACT nasal spray Spray 2 Sprays in nose every day. 16 g 0   • Levothyroxine Sodium (SYNTHROID PO) Take  by mouth.     • lorazepam (ATIVAN) 1 MG TABS Take 1 mg by mouth every four hours as needed.       No current Epic-ordered facility-administered medications on file.        Past Medical History:   Diagnosis Date   • Allergy, unspecified not elsewhere classified    • Degenerative disc disease    • Depression    • Thyroid disease        Social History   Substance Use Topics   • Smoking status: Never Smoker   • Smokeless tobacco: Never Used   • Alcohol use No       Family Status   Relation Status   • Mo (Not Specified)   • Fa (Not Specified)     Family History   Problem Relation Age of Onset   • Non-contributory Mother    • Non-contributory Father   "      Review of Systems:   Constitutional ROS: No unexpected change in weight, No weakness, No fatigue  Back: Persistent pain in the right thoracic region.  Hematologic/Lymphatic ROS: No chills, No night sweats, No weight loss  Skin/Integumentary ROS: No edema, No evidence of rash, No itching    Exam:  /84 (BP Location: Left arm, Patient Position: Sitting, BP Cuff Size: Large adult)   Pulse 80   Temp 37.1 °C (98.7 °F) (Temporal)   Resp 16   Ht 1.702 m (5' 7\")   Wt 82.6 kg (182 lb)   SpO2 97%   General: Well developed, well nourished. No distress.  Pulmonary: Unlabored respiratory effort.    Back: Mild localized tenderness in the mid thoracic spine on the right side between the spine and the scapula.  Extremities: Full range of motion right upper extremity.  No motor or sensory deficit noted.  Neurologic: Grossly nonfocal. No facial asymmetry noted.  Skin: Warm, dry, good turgor. No rashes in visible areas.   Psych: Normal mood. Alert and oriented x3. Judgment and insight is normal.    Assessment/Plan:  Referring to physical therapy.  Maintain current restrictions.  Continue over-the-counter medications, ice, heat, and topical muscle rubs as needed for pain.  Return to clinic in 3 weeks for reevaluation, sooner for any significant changes in symptoms.  1. Acute thoracic myofascial strain, subsequent encounter  REFERRAL TO PHYSICAL THERAPY Reason for Therapy: Eval/Treat/Report       "

## 2019-05-24 NOTE — LETTER
"EMPLOYEE’S CLAIM FOR COMPENSATION/ REPORT OF INITIAL TREATMENT  FORM C-4    EMPLOYEE’S CLAIM - PROVIDE ALL INFORMATION REQUESTED   First Name  Alida Last Name  Filiberto Birthdate                    1960                Sex  female Claim Number   Home Address  [address] Age  59 y.o. Height  1.702 m (5' 7\") Weight  82.6 kg (182 lb) [facility]     [facility] [place] Zip  [address] Telephone  [phone] (home)    Mailing Address  [address] [place] [facility] [place] Zip  [address] Primary Language Spoken  English    Insurer   Third Party   Greta Rush   Employee's Occupation (Job Title) When Injury or Occupational Disease Occurred      Employer's Name  [employer]  Telephone  [phone]    Employer Address  [address]  [place]  Zip  [address]    Date of Injury  4/25/2019               Hour of Injury  12:00 PM Date Employer Notified  4/29/2019 Last Day of Work after Injury or Occupational Disease  4/29/2019 Supervisor to Whom Injury Reported  Edmond/Ju   Address or Location of Accident (if applicable)  [[address] ]   What were you doing at the time of accident? (if applicable)  Lifting 40 lbs box    How did this injury or occupational disease occur? (Be specific an answer in detail. Use additional sheet if necessary)  boxx at bottom of crate,lifted it back (upper right) starting stabing pain   If you believe that you have an occupational disease, when did you first have knowledge of the disability and it relationship to your employment?  n/a Witnesses to the Accident  Co workers      Nature of Injury or Occupational Disease  Workers' Compensation  Part(s) of Body Injured or Affected  Upper Back Area (Thoracic Area), Abdomen Including Groin,     I certify that the above is true and correct to the best of my knowledge and that I have provided this " information in order to obtain the benefits of [place]  I hereby authorize any physician, chiropractor, surgeon, practitioner, or other person, any hospital, including [facility] or [facility], any medical service organization, any insurance company, or other institution or organization to release to each other, any medical or other information, including benefits paid or payable, pertinent to this injury or disease, except information relative to diagnosis, treatment and/or counseling for AIDS, psychological conditions, alcohol or controlled substances, for which I must give specific authorization.  A Photostat of this authorization shall be as valid as the original.     Date   Place   Employee’s Signature   THIS REPORT MUST BE COMPLETED AND MAILED WITHIN 3 WORKING DAYS OF TREATMENT   Place  [facility]  Name of Facility  [facility]   Date  5/24/2019 Diagnosis  (S29.019D) Acute thoracic myofascial strain, subsequent encounter Is there evidence the injured employee was under the influence of alcohol and/or another controlled substance at the time of accident?   Hour  8:03 AM Description of Injury or Disease  The encounter diagnosis was Acute thoracic myofascial strain, subsequent encounter.     Treatment     Have you advised the patient to remain off work five days or more?     X-Ray Findings      If Yes   From Date  To Date      From information given by the employee, together with medical evidence, can you directly connect this injury or occupational disease as job incurred?    If No Full Duty    Modified Duty      Is additional medical care by a physician indicated?    If Modified Duty, Specify any Limitations / Restrictions      Do you know of any previous injury or disease contributing to this condition or occupational disease?                               "  Date  5/24/2019 Print Doctor’s Name Claus Quintero P.A.-C. I certify the employer’s copy of  this form was mailed on:   Address  [address], [address] Insurer’s Use Only     [facility] Zip  [address]    Provider’s Tax ID Number  [id number] Telephone  Dept: [phone]        e-CLAUS Foote P.A.-C.   e-Signature: Dr. Shimon Catalan, Medical Director Degree  PAC        ORIGINAL-TREATING PHYSICIAN OR CHIROPRACTOR    PAGE 2-INSURER/TPA    PAGE 3-EMPLOYER    PAGE 4-EMPLOYEE             Form C-4 (rev10/07)              BRIEF DESCRIPTION OF RIGHTS AND BENEFITS  (Pursuant to [place]RS 616C.050)    Notice of Injury or Occupational Disease (Incident Report Form C-1): If an injury or occupational disease (OD) arises out of and in the  course of employment, you must provide written notice to your employer as soon as practicable, but no later than 7 days after the accident or  OD. Your employer shall maintain a sufficient supply of the required forms.    Claim for Compensation (Form C-4): If medical treatment is sought, the form C-4 is available at the place of initial treatment. A completed  \"Claim for Compensation\" (Form C-4) must be filed within 90 days after an accident or OD. The treating physician or chiropractor must,  within 3 working days after treatment, complete and mail to the employer, the employer's insurer and third-party , the Claim for  Compensation.    Medical Treatment: If you require medical treatment for your on-the-job injury or OD, you may be required to select a physician or  chiropractor from a list provided by your workers’ compensation insurer, if it has contracted with an Organization for Managed Care (MCO) or  Preferred Provider Organization (PPO) or providers of health care. If your employer has not entered into a contract with an MCO or PPO, you  may select a physician or chiropractor from the Panel of Physicians and Chiropractors. Any medical costs related to " your industrial injury or  OD will be paid by your insurer.    Temporary Total Disability (TTD): If your doctor has certified that you are unable to work for a period of at least 5 consecutive days, or 5  cumulative days in a 20-day period, or places restrictions on you that your employer does not accommodate, you may be entitled to TTD  compensation.    Temporary Partial Disability (TPD): If the wage you receive upon reemployment is less than the compensation for TTD to which you are  entitled, the insurer may be required to pay you TPD compensation to make up the difference. TPD can only be paid for a maximum of 24  months.    Permanent Partial Disability (PPD): When your medical condition is stable and there is an indication of a PPD as a result of your injury or  OD, within 30 days, your insurer must arrange for an evaluation by a rating physician or chiropractor to determine the degree of your PPD. The  amount of your PPD award depends on the date of injury, the results of the PPD evaluation and your age and wage.    Permanent Total Disability (PTD): If you are medically certified by a treating physician or chiropractor as permanently and totally disabled  and have been granted a PTD status by your insurer, you are entitled to receive monthly benefits not to exceed 66 2/3% of your average  monthly wage. The amount of your PTD payments is subject to reduction if you previously received a PPD award.    Vocational Rehabilitation Services: You may be eligible for vocational rehabilitation services if you are unable to return to the job due to a  permanent physical impairment or permanent restrictions as a result of your injury or occupational disease.    Transportation and Per Sae Reimbursement: You may be eligible for travel expenses and per sae associated with medical treatment.    Reopening: You may be able to reopen your claim if your condition worsens after claim closure.    Appeal Process: If you disagree  with a written determination issued by the insurer or the insurer does not respond to your request, you may  appeal to the Department of Administration, , by following the instructions contained in your determination letter. You must  appeal the determination within 70 days from the date of the determination letter at [address], [address], [place], [place]  [address], or [address], [address], [place], [place] [address]. If you disagree with the  decision, you may appeal to the  Department of Administration, . You must file your appeal within 30 days from the date of the  decision  letter at [address], [address], [place], [place] [address], or [address], [address], [place], [place] [address]. If you  disagree with a decision of an , you may file a petition for judicial review with the District Court. You must do so within 30  days of the Appeal Officer’s decision. You may be represented by an  at your own expense or you may contact the [facility] for possible  representation.    [place]  for Injured Workers (NAIW): If you disagree with a  decision, you may request that NAIW represent you  without charge at an  Hearing. For information regarding denial of benefits, you may contact the [facility] at: [address], [address], [place] [address], [phone], or [address], [address], [place] [address], [phone]    To File a Complaint with the Division: If you wish to file a complaint with the  of the Division of Industrial Relations (DIR),  please contact the Workers’ Compensation Section, [address], [address], [place], [place] [address], telephone [phone]) [phone], or  [address], [address], [place] [address], telephone [phone] [phone].    For assistance with Workers’ Compensation Issues: you  may contact the Office of the Governor Consumer Health Assistance, [address] [place] [address], [address], [place] [address], Toll Free 1-780.780.5901, Web site: http://govcha.[facility].nv.us, E-mail  Marianne@[facility].nv.                                                                                                                                                                                                                                   __________________________________________________________________                                                                   _________________                Employee Name / Signature                                                                                                                                                       Date                                                                                                                                                                                                     D-2 (rev. 10/07)

## 2019-05-24 NOTE — LETTER
[facility]  [address] [place] [address]  Phone:  [phone] - Fax:  [phone]   [facility] Progress Report and Disability Certification  Date of Service: 5/24/2019   No Show:  No  Date / Time of Next Visit: 6/14/2019   Claim Information   Patient Name: Alida De Los Santos  Claim Number:     Employer: [employer]  Date of Injury: 4/25/2019     Insurer / TPA: Greta [place]  ID / SSN:     Occupation:   Diagnosis: The encounter diagnosis was Acute thoracic myofascial strain, subsequent encounter.    Medical Information   Related to Industrial Injury? Yes    Subjective Complaints:  DOI: 4/25/2019, 6th visit.    Patient reports right upper back strain that occurred while lifting a 40 pound box at work.  Last week, prior to the course of steroids, she was having pain even at rest.  This week she denies pain at rest, only complaining of pain with movement of the right upper extremity.  She continues doing over-the-counter topical salon pas, heat and cool packs as well. Patient denies numbness or tingling to right hand.  Denies previous injury to this area.  Denies second job.  Patient is right-handed.      Objective Findings: Back: Mild localized tenderness in the mid thoracic spine on the right side between the spine and the scapula.  Extremities: Full range of motion right upper extremity.  No motor or sensory deficit noted.   Pre-Existing Condition(s):     Assessment:   Condition Improved    Status: Additional Care Required  Permanent Disability:No    Plan:      Diagnostics:      Comments:  Assessment/Plan:  Referring to physical therapy.  Maintain current restrictions.  Continue over-the-counter medications, ice, heat, and topical muscle rubs as needed for pain.  Return to clinic in 3 weeks for reevaluation, sooner for any significant   changes in symptoms.  1. Acute thoracic myofascial strain, subsequent encounter  REFERRAL  TO PHYSICAL THERAPY Reason for Therapy: Eval/Treat/Report    Disability Information   Status: Released to Restricted Duty    From:  5/24/2019  Through: 6/14/2019 Restrictions are: Temporary   Physical Restrictions   Sitting:    Standing:    Stooping:    Bending:      Squatting:    Walking:    Climbing:    Pushing:      Pulling:    Other:    Reaching Above Shoulder (L):   Reaching Above Shoulder (R): 0 hrs/day     Reaching Below Shoulder (L):    Reaching Below Shoulder (R):      Not to exceed Weight Limits   Carrying(hrs):   Weight Limit(lb): < or = to 10 pounds Lifting(hrs):   Weight  Limit(lb): < or = to 10 pounds   Comments: No lifting, carrying, pushing, or pulling more than 10 pounds     Repetitive Actions   Hands: i.e. Fine Manipulations from Grasping:     Feet: i.e. Operating Foot Controls:     Driving / Operate Machinery:     Physician Name: Claus Quintero P.A.-C. Physician Signature: CLAUS Bruce P.A.-C. e-Signature: Dr. Shimon Catalan, Medical Director   Clinic Name / Location: [address], [address]  [place] [address] Clinic Phone Number: Dept: [phone]   Appointment Time: 8:15 Am Visit Start Time: 8:03 AM   Check-In Time:  7:59 Am Visit Discharge Time:  8:19 AM   Original-Treating Physician or Chiropractor    Page 2-Insurer/TPA    Page 3-Employer    Page 4-Employee

## 2019-06-14 ENCOUNTER — OCCUPATIONAL MEDICINE (OUTPATIENT)
Dept: URGENT CARE | Facility: CLINIC | Age: 59
End: 2019-06-14
Payer: COMMERCIAL

## 2019-06-14 VITALS
DIASTOLIC BLOOD PRESSURE: 88 MMHG | HEART RATE: 77 BPM | RESPIRATION RATE: 16 BRPM | WEIGHT: 184 LBS | SYSTOLIC BLOOD PRESSURE: 136 MMHG | OXYGEN SATURATION: 98 % | BODY MASS INDEX: 28.88 KG/M2 | TEMPERATURE: 97.9 F | HEIGHT: 67 IN

## 2019-06-14 DIAGNOSIS — S29.019D ACUTE THORACIC MYOFASCIAL STRAIN, SUBSEQUENT ENCOUNTER: ICD-10-CM

## 2019-06-14 PROCEDURE — 99214 OFFICE O/P EST MOD 30 MIN: CPT | Mod: 29 | Performed by: PHYSICIAN ASSISTANT

## 2019-06-14 ASSESSMENT — ENCOUNTER SYMPTOMS
FOCAL WEAKNESS: 0
TINGLING: 0
SENSORY CHANGE: 0

## 2019-06-14 ASSESSMENT — PAIN SCALES - GENERAL: PAINLEVEL: NO PAIN

## 2019-06-14 NOTE — PROGRESS NOTES
"Subjective:      Alida De Los Santos is a 59 y.o. female who presents with Back Injury (WC FV feeling better )    Pt PMH, SocHx, SurgHx, FamHx, Drug allergies and medications reviewed with pt/EPIC.      Family history reviewed, it is not pertinent to this complaint.       Subjective Complaints: DOI: 4/25/2019, 7th visit.     PT reports right upper back strain that occurred while lifting a 40 pound box at work several weeks ago.  She has been to physical therapy with very good results, states she is feeling much better and would like to return to work with increased weight allowances, up to 20 pounds rather than less than 10 pounds.  Pt states she is eager to continue physical therapy so she can return to work as soon as she can without restrictions.  Pt is using otc salon pas, heat/cool packs with good effect.  PT denies numbness/tingling to right hand.  PT continues to have right sided muscle pain, tightness and spasm in neck and shoulder, but it improving more quickly now after beginning physical therapy. Denies previous injury to this area.  Denies second job.  Patient is right-handed.          Work comp follow up, right shoulder injury.        Review of Systems   Neurological: Negative for tingling, sensory change and focal weakness.   All other systems reviewed and are negative.         Objective:     /88 (BP Location: Right arm, Patient Position: Sitting)   Pulse 77   Temp 36.6 °C (97.9 °F) (Temporal)   Resp 16   Ht 1.702 m (5' 7\")   Wt 83.5 kg (184 lb)   SpO2 98%   BMI 28.82 kg/m²      Physical Exam       Objective Findings: Back: Mild localized tenderness in the mid thoracic spine on the right side between the spine and the scapula. Muscle spasm is still present and palpable to trapezius/rhomboid area.   Extremities show FROM in all planes of movement.  Pain with resisted movements of RUE but ROM has increased since last visit.           Assessment/Plan:     1. Acute thoracic myofascial strain, " subsequent encounter  REFERRAL TO PHYSICAL THERAPY Reason for Therapy: Eval/Treat/Report     New order for 6 more PT visits, will return as scheduled.     Follow D-39 instructions/restrictions. Return to clinic as scheduled.

## 2019-06-14 NOTE — LETTER
[facility]  [address] [place] [address]  Phone:  [phone] - Fax:  [phone]   Occ[facility]ational [facility] Network Progress Report and Disability Certification  Date of Service: 6/14/2019   No Show:  No  Date / Time of Next Visit: 7/5/2019   Claim Information   Patient Name: Alida De Los Santos  Claim Number:     Employer: [employer]  Date of Injury: 4/25/2019     Insurer / TPA: Greta [place]  ID / SSN:     Occupation:   Diagnosis: The encounter diagnosis was Acute thoracic myofascial strain, subsequent encounter.    Medical Information   Related to Industrial Injury? Yes    Subjective Complaints:  Subjective Complaints: DOI: 4/25/2019, 7th visit.     PT reports right upper back strain that occurred while lifting a 40 pound box at work several weeks ago.  She has been to physical therapy with very good results, states she is feeling much better and would like to return to work with increased weight allowances, up to 20 pounds rather than less than 10 pounds.  Pt states she is eager to continue physical therapy so she can return to work as soon as she can without restrictions.  Pt is using otc salon pas, heat/cool packs with good effect.  PT denies numbness/tingling to right hand.  PT continues to have right sided muscle pain, tightness and spasm in neck and shoulder, but it improving more quickly now after beginning physical therapy. Denies previous injury to this area.  Denies second job.  Patient is right-handed.        Objective Findings:    Objective Findings: Back: Mild localized tenderness in the mid thoracic spine on the right side between the spine and the scapula. Muscle spasm is still present and palpable to trapezius/rhomboid area.   Extremities show FROM in all planes of movement.  Pain with resisted movements of RUE but ROM has increased since last visit.       Pre-Existing Condition(s):     Assessment:   Condition Improved       Status: Additional Care Required  Permanent Disability:No    Plan: PT  Comments:new order for 6  more sessions of PT    Diagnostics:      Comments:       Disability Information   Status: Released to Restricted Duty    From:  6/14/2019  Through: 7/5/2019 Restrictions are: Temporary   Physical Restrictions   Sitting:    Standing:    Stooping:    Bending:      Squatting:    Walking:    Climbing:    Pushing:      Pulling:    Other:    Reaching Above Shoulder (L):   Reaching Above Shoulder (R):       Reaching Below Shoulder (L):    Reaching Below Shoulder (R):      Not to exceed Weight Limits   Carrying(hrs):   Weight Limit(lb):   Comments:weight limited to 20 pounds Lifting(hrs):   Weight  Limit(lb):   Comments:weight limited to 20 pounds   Comments: PT can return to work with weight limitations only.  PT continue with physical therapy as ordered.  Will return as scheduled for re-evaluation.      Repetitive Actions   Hands: i.e. Fine Manipulations from Grasping:     Feet: i.e. Operating Foot Controls:     Driving / Operate Machinery:     Physician Name: Steven Zarate P.A.-C. Physician Signature: STEVEN Valerio P.A.-C. e-Signature: Dr. Shimon Catalan, Medical Director   Clinic Name / Location: [address], [address]  [place] [address] Clinic Phone Number: Dept: [phone]   Appointment Time: 8:00 Am Visit Start Time: 8:06 AM   Check-In Time:  8:03 Am Visit Discharge Time:  8:45AM   Original-Treating Physician or Chiropractor    Page 2-Insurer/TPA    Page 3-Employer    Page 4-Employee

## 2019-07-05 ENCOUNTER — OCCUPATIONAL MEDICINE (OUTPATIENT)
Dept: URGENT CARE | Facility: CLINIC | Age: 59
End: 2019-07-05
Payer: COMMERCIAL

## 2019-07-05 VITALS
OXYGEN SATURATION: 97 % | HEIGHT: 67 IN | WEIGHT: 184 LBS | SYSTOLIC BLOOD PRESSURE: 138 MMHG | TEMPERATURE: 98 F | RESPIRATION RATE: 16 BRPM | DIASTOLIC BLOOD PRESSURE: 70 MMHG | HEART RATE: 84 BPM | BODY MASS INDEX: 28.88 KG/M2

## 2019-07-05 DIAGNOSIS — S29.019D ACUTE THORACIC MYOFASCIAL STRAIN, SUBSEQUENT ENCOUNTER: ICD-10-CM

## 2019-07-05 PROCEDURE — 99213 OFFICE O/P EST LOW 20 MIN: CPT | Mod: 29 | Performed by: PHYSICIAN ASSISTANT

## 2019-07-05 ASSESSMENT — ENCOUNTER SYMPTOMS
TINGLING: 0
SENSORY CHANGE: 0
BACK PAIN: 0

## 2019-07-05 NOTE — LETTER
[facility]  [address] [facility] [address], [address] [place] [address]  Phone:  [phone] - Fax:  [phone]   [facility] Progress Report and Disability Certification  Date of Service: 7/5/2019   No Show:  No  Date / Time of Next Visit: 7/19/2019   Claim Information   Patient Name: Alida De Los Santos  Claim Number:     Employer: [employer]  Date of Injury: 4/25/2019     Insurer / TPA: Greta [place]  ID / SSN:     Occupation:   Diagnosis: The encounter diagnosis was Acute thoracic myofascial strain, subsequent encounter.    Medical Information   Related to Industrial Injury? Yes    Subjective Complaints:  DOI 4/25/19. Patient presents today with resolved symptoms. She states that PT has been helping significantly with her symptoms. She denies any current back pain. She states that she has not been using salon pas or heat as her pain has not been present. She has been tolerating work restrictions well, but is ready to be released to full duty.    Objective Findings: Constitutional: She is oriented to person, place, and time. She appears well-developed and well-nourished. No distress.   HENT:   Head: Normocephalic and atraumatic.   Nose: Nose normal.   Eyes: Conjunctivae and EOM are normal.   Neck: Normal range of motion. No tracheal deviation present.   Pulmonary/Chest: Effort normal. No respiratory distress.   Musculoskeletal:        Right shoulder: She exhibits normal range of motion, no tenderness, no bony tenderness, no swelling, no effusion, no crepitus, no deformity, no laceration, normal pulse and normal strength.        Thoracic back: She exhibits normal range of motion, no tenderness, no bony tenderness, no swelling, no edema, no deformity, no laceration and normal pulse.   ROM normal all four extremities   Neurological: She is alert and oriented to person, place, and time.   Skin: Skin is warm and dry.   Psychiatric: She has a normal mood  and affect. Her behavior is normal. Judgment and thought content normal.    Pre-Existing Condition(s):     Assessment:   Condition Improved    Status: Additional Care Required  Permanent Disability:No    Plan:   Comments:Continue salon pas, heat as needed. Complete PT.    Diagnostics:      Comments:       Disability Information   Status: Released to Full Duty    From:  7/5/2019  Through: 7/19/2019 Restrictions are:     Physical Restrictions   Sitting:    Standing:    Stooping:    Bending:      Squatting:    Walking:    Climbing:    Pushing:      Pulling:    Other:    Reaching Above Shoulder (L):   Reaching Above Shoulder (R):       Reaching Below Shoulder (L):    Reaching Below Shoulder (R):      Not to exceed Weight Limits   Carrying(hrs):   Weight Limit(lb):   Lifting(hrs):   Weight  Limit(lb):     Comments: Trial full duty. Return in 2 weeks after completion of PT.     Repetitive Actions   Hands: i.e. Fine Manipulations from Grasping:     Feet: i.e. Operating Foot Controls:     Driving / Operate Machinery:     Physician Name: [facility] Physician Signature: NATHALIE Lagos P.A.-C. e-Signature: Dr. Shimon Catalan, Medical Director   Clinic Name / Location: [facility]  [address] [facility] [address], [address] [place] [address] Clinic Phone Number: Dept: [phone]   Appointment Time: 8:00 Am Visit Start Time: 8:12 AM   Check-In Time:  8:02 Am Visit Discharge Time:  8:25 AM   Original-Treating Physician or Chiropractor    Page 2-Insurer/TPA    Page 3-Employer    Page 4-Employee

## 2019-07-05 NOTE — PROGRESS NOTES
"Subjective:   Alida De Los Santos is a 59 y.o. female who presents for Back Pain (WC Fv, Pt states she is ready to return to work)    DOI 4/25/19. Patient presents today with resolved symptoms. She states that PT has been helping significantly with her symptoms. She denies any current back pain. She states that she has not been using salon pas or heat as her pain has not been present. She has been tolerating work restrictions well, but is ready to be released to full duty.      HPI  Review of Systems   Musculoskeletal: Negative for back pain.   Neurological: Negative for tingling and sensory change.       Pt PMH, SocHx, SurgHx, FamHx, Drug allergies and medications reviewed with pt/EPIC.  Family history reviewed, it is not pertinent to this complaint.       Objective:   /70   Pulse 84   Temp 36.7 °C (98 °F) (Temporal)   Resp 16   Ht 1.702 m (5' 7\")   Wt 83.5 kg (184 lb)   SpO2 97%   BMI 28.82 kg/m²   Physical Exam   Constitutional: She is oriented to person, place, and time. She appears well-developed and well-nourished. No distress.   HENT:   Head: Normocephalic and atraumatic.   Nose: Nose normal.   Eyes: Conjunctivae and EOM are normal.   Neck: Normal range of motion. No tracheal deviation present.   Pulmonary/Chest: Effort normal. No respiratory distress.   Musculoskeletal:        Right shoulder: She exhibits normal range of motion, no tenderness, no bony tenderness, no swelling, no effusion, no crepitus, no deformity, no laceration, normal pulse and normal strength.        Thoracic back: She exhibits normal range of motion, no tenderness, no bony tenderness, no swelling, no edema, no deformity, no laceration and normal pulse.   ROM normal all four extremities   Neurological: She is alert and oriented to person, place, and time.   Skin: Skin is warm and dry.   Psychiatric: She has a normal mood and affect. Her behavior is normal. Judgment and thought content normal.   Vitals " reviewed.      Assessment/Plan:   1. Acute thoracic myofascial strain, subsequent encounter    - Advised to continue salon pas, heat as needed  - Advised to continue and complete PT. Patient states she has 4 visits left  - Trial full duty  - Follow up in 2 weeks upon completion of PT for likely MMI    Differential diagnosis, natural history, supportive care, and indications for immediate follow-up discussed.

## 2019-07-07 ENCOUNTER — OCCUPATIONAL MEDICINE (OUTPATIENT)
Dept: URGENT CARE | Facility: CLINIC | Age: 59
End: 2019-07-07
Payer: COMMERCIAL

## 2019-07-07 VITALS
RESPIRATION RATE: 14 BRPM | TEMPERATURE: 98.2 F | SYSTOLIC BLOOD PRESSURE: 146 MMHG | BODY MASS INDEX: 28.88 KG/M2 | HEIGHT: 67 IN | DIASTOLIC BLOOD PRESSURE: 82 MMHG | WEIGHT: 184 LBS | OXYGEN SATURATION: 94 % | HEART RATE: 108 BPM

## 2019-07-07 DIAGNOSIS — S29.019S: ICD-10-CM

## 2019-07-07 PROCEDURE — 99214 OFFICE O/P EST MOD 30 MIN: CPT | Mod: 29 | Performed by: PHYSICIAN ASSISTANT

## 2019-07-07 RX ORDER — ACETAMINOPHEN AND CODEINE PHOSPHATE 300; 30 MG/1; MG/1
0.5 TABLET ORAL EVERY 8 HOURS PRN
Qty: 11 TAB | Refills: 0 | Status: SHIPPED | OUTPATIENT
Start: 2019-07-07 | End: 2019-07-14

## 2019-07-07 NOTE — LETTER
"   [facility]  [address] [facility] [address] [place] [address]  Phone:  [phone] - Fax:  [phone]   [facility] Progress Report and Disability Certification  Date of Service: 7/7/2019   No Show:  No  Date / Time of Next Visit: 7/12/2019   Claim Information   Patient Name: Alida De Los Santos  Claim Number:     Employer: [employer]  Date of Injury: 4/25/2019     Insurer / TPA: Greta Rochester  ID / SSN:     Occupation:   Diagnosis: The encounter diagnosis was Acute thoracic myofascial strain, sequela.    Medical Information   Related to Industrial Injury? Yes    Subjective Complaints:  DOI: 4/29/19- Recheck today as patient trialed full duty today of which this was her first day on returning when the pushing and pulling along with lifting set off as sharp stabbing pain to her right upper back very similar to the original injury.  Prior to such patient was improving significantly with PT and no longer was needing to take substantial medication.  Further discussion patient reports that the Toradol, Robaxin and other medications were not improving her symptoms and originally the first week was \"unbearable\".  She is tearful during the exam today as she feels that this is significantly worse than what it has been.  She does report movement of her right upper extremity exacerbates symptoms.  Patient denies any new numbness or tingling down her arm.  Original injury was when patient had lifted the bottom of a crate feeling sharp stabbing pain to her right upper back-box weighed approximately 40 pounds at that time.  Patient denies second job.   Objective Findings: Without midline spinal tenderness.  Tenderness to the right thoracic paraspinous area with radiation to the right upper trapezius and rhomboid.  With associated spasm.  Spurling's negative.   minimally weaker on the right.  Neurovascularly intact distally.   Pre-Existing Condition(s):   " "  Assessment:   Condition Worsened    Status: Discharged / Care Transfer  Permanent Disability:No    Plan: Transfer Care  Comments:Transferred to occupational health at this time    Diagnostics:      Comments:       Disability Information   Status: Released to Restricted Duty    From:  2019  Through: 2019 Restrictions are: Temporary   Physical Restrictions   Sitting:    Standing:    Stooping:    Bending:      Squatting:    Walking:    Climbing:    Pushin hrs/day   Pulling:    Other:    Reaching Above Shoulder (L):   Reaching Above Shoulder (R): 0 hrs/day     Reaching Below Shoulder (L):    Reaching Below Shoulder (R):      Not to exceed Weight Limits   Carrying(hrs):   Weight Limit(lb): < or = to 10 pounds Lifting(hrs):   Weight  Limit(lb): < or = to 10 pounds   Comments: Today is patient's eighth visit in the urgent care at this time.  She is discharged from urgent care and will follow up with occupational health Friday.  Will re-trial light duty at this time.  Patient under significant amount of discomfort at this time and Tylenol with codeine was written.  Patient noted history of \"allergy\" of which this was mainly nausea.  She has taken halves in the past without difficulty.  #11 was written for this patient to take every 8 hours.  She is to avoid driving while on such.  She is to follow up with occupational health and is to return to clinic for any worsening symptoms.    Repetitive Actions   Hands: i.e. Fine Manipulations from Grasping:     Feet: i.e. Operating Foot Controls:     Driving / Operate Machinery:     Physician Name: Srinivas Reyes P.A.-C. Physician Signature: SRINIVAS Urbina P.A.-C. e-Signature: Dr. Shimon Catalan, Medical Director   Clinic Name / Location: [facility] [address] [address] Clinic Phone Number: Dept: [phone]   Appointment Time: 4:30 Pm Visit Start Time: 5:27 PM   Check-In Time:  4:29 Pm Visit Discharge Time: 6:11 PM "   Original-Treating Physician or Chiropractor    Page 2-Insurer/TPA    Page 3-Employer    Page 4-Employee

## 2019-07-08 ASSESSMENT — ENCOUNTER SYMPTOMS
BACK PAIN: 1
TINGLING: 0
NUMBNESS: 0
BOWEL INCONTINENCE: 0

## 2019-07-08 NOTE — PROGRESS NOTES
"Subjective:      Alida De Los Santos is a 59 y.o. female who presents with Work-Related Injury (WC f/v stabbing back pain - not improving)      DOI: 4/29/19- Recheck today as patient trialed full duty today of which this was her first day on returning when the pushing and pulling along with lifting set off as sharp stabbing pain to her right upper back very similar to the original injury.  Prior to such patient was improving significantly with PT and no longer was needing to take substantial medication.  Further discussion patient reports that the Toradol, Robaxin and other medications were not improving her symptoms and originally the first week was \"unbearable\".  She is tearful during the exam today as she feels that this is significantly worse than what it has been.  She does report movement of her right upper extremity exacerbates symptoms.  Patient denies any new numbness or tingling down her arm.  Original injury was when patient had lifted the bottom of a crate feeling sharp stabbing pain to her right upper back-box weighed approximately 40 pounds at that time.  Patient denies second job.     Back Pain   This is a new problem. The current episode started more than 1 month ago. The problem has been waxing and waning since onset. The pain is present in the thoracic spine. The quality of the pain is described as aching and stabbing. The pain is at a severity of 8/10. The pain is severe. The symptoms are aggravated by position. Pertinent negatives include no bladder incontinence, bowel incontinence, numbness or tingling. She has tried nothing for the symptoms.   Also a further note patient has had minimal improvement with over-the-counter medications.  Also of note she is been written Robaxin, diclofenac gel, steroids etc. as patient has history of GI bleed of which medication is limited.    Review of Systems   Gastrointestinal: Negative for bowel incontinence.   Genitourinary: Negative for bladder incontinence. " "  Musculoskeletal: Positive for back pain.   Neurological: Negative for tingling and numbness.          Objective:     /82 (BP Location: Left arm, Patient Position: Sitting, BP Cuff Size: Adult)   Pulse (!) 108   Temp 36.8 °C (98.2 °F) (Temporal)   Resp 14   Ht 1.702 m (5' 7.01\")   Wt 83.5 kg (184 lb)   SpO2 94%   BMI 28.81 kg/m²    PMH:     PMH: No pertinent past medical history to this problem  MEDS: Medications were reviewed in Epic  ALLERGIES: Allergies were reviewed in Epic  FH: No pertinent family history to this problem      Physical Exam   Constitutional: She is oriented to person, place, and time. She appears well-developed and well-nourished. She appears distressed.   HENT:   Head: Normocephalic and atraumatic.   Eyes: Pupils are equal, round, and reactive to light. Conjunctivae and EOM are normal.   Neck: Normal range of motion. Neck supple. No tracheal deviation present.   Cardiovascular: Tachycardia present.    Pulmonary/Chest: Effort normal. No respiratory distress.   Neurological: She is alert and oriented to person, place, and time.   Skin: Skin is warm.   Psychiatric: She has a normal mood and affect. Her behavior is normal. Judgment and thought content normal.   Vitals reviewed.      Without midline spinal tenderness.  Tenderness to the right thoracic paraspinous area with radiation to the right upper trapezius and rhomboid.  With associated spasm.  Spurling's negative.   minimally weaker on the right.  Neurovascularly intact distally.       Assessment/Plan:     1. Acute thoracic myofascial strain, sequela  - Acetaminophen-Codeine 300-30 MG Tab; Take 0.5 Tabs by mouth every 8 hours as needed (must avoid while driving) for up to 7 days.  Dispense: 11 Tab; Refill: 0  - Consent for Opiate Prescription    NARXCHECK was reviewed by myself-  Document does not reveal any concerning patterns. Pt. was advised to avoid the operation of heavy machine along with driving while on such " medications. Finally pt. was advised to use medication only as prescribed.     Please see D 39 for further information.  Patient was improving at last visit and was released to full duty.  Few hours into full duty patient immediately had return of symptoms of which patient is very uncomfortable and tearful throughout the duration of the visit today.  At this time patient is with several visits through urgent care of which at this time I feel patient should be discharged to occupational health at [facility]  Patient will follow up with [facility] by the end of the week.  Tylenol with codeine was written today as patient reports that she has been successful with taking half a dose in the past.  Patient also is to trial ice and heat rotation and will also re-trial light duty at this time. RTC for any worsening symptoms.

## 2019-12-29 ENCOUNTER — APPOINTMENT (OUTPATIENT)
Dept: RADIOLOGY | Facility: MEDICAL CENTER | Age: 59
End: 2019-12-29
Attending: EMERGENCY MEDICINE

## 2019-12-29 ENCOUNTER — HOSPITAL ENCOUNTER (EMERGENCY)
Facility: MEDICAL CENTER | Age: 59
End: 2019-12-29
Attending: EMERGENCY MEDICINE

## 2019-12-29 VITALS
RESPIRATION RATE: 18 BRPM | WEIGHT: 197 LBS | TEMPERATURE: 98.2 F | HEART RATE: 92 BPM | OXYGEN SATURATION: 93 % | SYSTOLIC BLOOD PRESSURE: 122 MMHG | BODY MASS INDEX: 30.92 KG/M2 | DIASTOLIC BLOOD PRESSURE: 77 MMHG | HEIGHT: 67 IN

## 2019-12-29 DIAGNOSIS — M25.461 KNEE EFFUSION, RIGHT: ICD-10-CM

## 2019-12-29 DIAGNOSIS — S89.91XA INJURY OF RIGHT KNEE, INITIAL ENCOUNTER: ICD-10-CM
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
## 2019-12-29 LAB
ANION GAP SERPL CALC-SCNC: 9 MMOL/L (ref 0–11.9)
APTT PPP: 25.3 SEC (ref 24.7–36)
BUN SERPL-MCNC: 12 MG/DL (ref 8–22)
CALCIUM SERPL-MCNC: 9.5 MG/DL (ref 8.5–10.5)
CHLORIDE SERPL-SCNC: 106 MMOL/L (ref 96–112)
CO2 SERPL-SCNC: 24 MMOL/L (ref 20–33)
CREAT SERPL-MCNC: 0.73 MG/DL (ref 0.5–1.4)
ERYTHROCYTE [DISTWIDTH] IN BLOOD BY AUTOMATED COUNT: 55.3 FL (ref 35.9–50)
GLUCOSE SERPL-MCNC: 123 MG/DL (ref 65–99)
HCT VFR BLD AUTO: 42.9 % (ref 37–47)
HGB BLD-MCNC: 14.6 G/DL (ref 12–16)
INR PPP: 0.9 (ref 0.87–1.13)
MCH RBC QN AUTO: 34.1 PG (ref 27–33)
MCHC RBC AUTO-ENTMCNC: 34 G/DL (ref 33.6–35)
MCV RBC AUTO: 100.2 FL (ref 81.4–97.8)
MORPHOLOGY BLD-IMP: NORMAL
PLATELET # BLD AUTO: 305 K/UL (ref 164–446)
PMV BLD AUTO: 11.1 FL (ref 9–12.9)
POTASSIUM SERPL-SCNC: 4.2 MMOL/L (ref 3.6–5.5)
PROTHROMBIN TIME: 12.4 SEC (ref 12–14.6)
RBC # BLD AUTO: 4.28 M/UL (ref 4.2–5.4)
SODIUM SERPL-SCNC: 139 MMOL/L (ref 135–145)
WBC # BLD AUTO: 12.6 K/UL (ref 4.8–10.8)

## 2019-12-29 PROCEDURE — 85027 COMPLETE CBC AUTOMATED: CPT

## 2019-12-29 PROCEDURE — 99284 EMERGENCY DEPT VISIT MOD MDM: CPT

## 2019-12-29 PROCEDURE — 73700 CT LOWER EXTREMITY W/O DYE: CPT | Mod: RT

## 2019-12-29 PROCEDURE — 96374 THER/PROPH/DIAG INJ IV PUSH: CPT

## 2019-12-29 PROCEDURE — 85730 THROMBOPLASTIN TIME PARTIAL: CPT

## 2019-12-29 PROCEDURE — 700111 HCHG RX REV CODE 636 W/ 250 OVERRIDE (IP): Performed by: EMERGENCY MEDICINE

## 2019-12-29 PROCEDURE — 80048 BASIC METABOLIC PNL TOTAL CA: CPT

## 2019-12-29 PROCEDURE — 85610 PROTHROMBIN TIME: CPT

## 2019-12-29 PROCEDURE — 96375 TX/PRO/DX INJ NEW DRUG ADDON: CPT

## 2019-12-29 PROCEDURE — 73564 X-RAY EXAM KNEE 4 OR MORE: CPT | Mod: RT

## 2019-12-29 RX ORDER — ONDANSETRON 2 MG/ML
4 INJECTION INTRAMUSCULAR; INTRAVENOUS ONCE
Status: COMPLETED | OUTPATIENT
Start: 2019-12-29 | End: 2019-12-29

## 2019-12-29 RX ORDER — HYDROCODONE BITARTRATE AND ACETAMINOPHEN 5; 325 MG/1; MG/1
1-2 TABLET ORAL EVERY 4 HOURS PRN
Qty: 12 TAB | Refills: 0 | Status: SHIPPED | OUTPATIENT
Start: 2019-12-29 | End: 2020-01-03

## 2019-12-29 RX ORDER — HYDROMORPHONE HYDROCHLORIDE 1 MG/ML
0.5 INJECTION, SOLUTION INTRAMUSCULAR; INTRAVENOUS; SUBCUTANEOUS ONCE
Status: COMPLETED | OUTPATIENT
Start: 2019-12-29 | End: 2019-12-29

## 2019-12-29 RX ADMIN — ONDANSETRON 4 MG: 2 INJECTION INTRAMUSCULAR; INTRAVENOUS at 06:18

## 2019-12-29 RX ADMIN — HYDROMORPHONE HYDROCHLORIDE 0.5 MG: 1 INJECTION, SOLUTION INTRAMUSCULAR; INTRAVENOUS; SUBCUTANEOUS at 06:17

## 2019-12-29 NOTE — ED NOTES
Pt verbally understands d/c instructions and usage of knee immobilizer and crutches. Pt given 1 paper prescription. Pt stable and leaving via wheelchair with crutches in hand upon discharge.

## 2019-12-29 NOTE — ED PROVIDER NOTES
ED Provider Note    Scribed for Jonah Sierra M.D. by Myrtle Cardoso. 12/29/2019  5:37 AM    Primary care provider: Kate Lanier M.D.  Means of arrival: Wheelchair  History obtained from: patient   History limited by: none    CHIEF COMPLAINT  Chief Complaint   Patient presents with   • T-5000 GLF   • Knee Injury     right, unable to bear weight, swollen        HPI  Alida De Los Santos is a 59 y.o. female who presents to the Emergency Department for evaluation after a fall onset 8:30 PM last night. She describes that she tripped on a raised part of her carpet and fell directly on her right knee. Denies any loss of consciousness, other injuries, or pain other than her knee.     REVIEW OF SYSTEMS  Pertinent positives include Right knee pain.   Pertinent negatives include no any loss of consciousness, other injuries, or pain other than her knee.    All other systems reviewed and negative. See HPI for further details.       PAST MEDICAL HISTORY   has a past medical history of Allergy, unspecified not elsewhere classified, Degenerative disc disease, Depression, and Thyroid disease.    SURGICAL HISTORY  patient denies any surgical history    SOCIAL HISTORY  Social History     Tobacco Use   • Smoking status: Never Smoker   • Smokeless tobacco: Never Used   Substance Use Topics   • Alcohol use: No   • Drug use: No      Social History     Substance and Sexual Activity   Drug Use No       FAMILY HISTORY  Family History   Problem Relation Age of Onset   • Non-contributory Mother    • Non-contributory Father        CURRENT MEDICATIONS  Home Medications     Reviewed by Jonathan Harris R.N. (Registered Nurse) on 12/29/19 at 0540  Med List Status: <None>   Medication Last Dose Status   Diclofenac Sodium 1 % Gel  Active   fluticasone (FLONASE) 50 MCG/ACT nasal spray  Active   Levothyroxine Sodium (SYNTHROID PO)  Active   lorazepam (ATIVAN) 1 MG TABS  Active   methocarbamol (ROBAXIN) 750 MG Tab  Active   MethylPREDNISolone  "(MEDROL DOSEPAK) 4 MG Tablet Therapy Pack  Active                ALLERGIES  Allergies   Allergen Reactions   • Codeine    • Nexium        PHYSICAL EXAM  VITAL SIGNS: /90   Pulse (!) 119   Temp 36.8 °C (98.2 °F) (Temporal)   Resp 18   Ht 1.702 m (5' 7\")   Wt 89.4 kg (197 lb)   SpO2 98%   BMI 30.85 kg/m²     Constitutional: Well-developed and well-nourished. Mild distress secondary to pain  HENT: Head is normocephalic and atraumatic.   Cardiovascular: Capillary refill is less than 2 seconds  Pulmonary/Chest: No respiratory distress.    Abdominal: Atraumatic.  Musculoskeletal: Abrasion to anterior aspect right knee, moderate knee effusion.  Neurological: Awake, alert and oriented to person, place, and time. she is neurologically intact.   Skin: Skin is warm and dry. No rash.   Psychiatric: Normal mood and affect. Appropriate for clinical situation    DIAGNOSTIC STUDIES / PROCEDURES    LABS  Results for orders placed or performed during the hospital encounter of 12/29/19   CBC WITHOUT DIFFERENTIAL   Result Value Ref Range    WBC 12.6 (H) 4.8 - 10.8 K/uL    RBC 4.28 4.20 - 5.40 M/uL    Hemoglobin 14.6 12.0 - 16.0 g/dL    Hematocrit 42.9 37.0 - 47.0 %    .2 (H) 81.4 - 97.8 fL    MCH 34.1 (H) 27.0 - 33.0 pg    MCHC 34.0 33.6 - 35.0 g/dL    RDW 55.3 (H) 35.9 - 50.0 fL    Platelet Count 305 164 - 446 K/uL    MPV 11.1 9.0 - 12.9 fL   BASIC METABOLIC PANEL   Result Value Ref Range    Sodium 139 135 - 145 mmol/L    Potassium 4.2 3.6 - 5.5 mmol/L    Chloride 106 96 - 112 mmol/L    Co2 24 20 - 33 mmol/L    Glucose 123 (H) 65 - 99 mg/dL    Bun 12 8 - 22 mg/dL    Creatinine 0.73 0.50 - 1.40 mg/dL    Calcium 9.5 8.5 - 10.5 mg/dL    Anion Gap 9.0 0.0 - 11.9   APTT   Result Value Ref Range    APTT 25.3 24.7 - 36.0 sec   PROTHROMBIN TIME (INR)   Result Value Ref Range    PT 12.4 12.0 - 14.6 sec    INR 0.90 0.87 - 1.13   ESTIMATED GFR   Result Value Ref Range    GFR If African American >60 >60 mL/min/1.73 m 2    GFR " If Non African American >60 >60 mL/min/1.73 m 2   PERIPHERAL SMEAR REVIEW   Result Value Ref Range    Peripheral Smear Review see below      All labs reviewed by me.    RADIOLOGY  CT-KNEE W/O PLUS RECONS RIGHT   Final Result            No acute fracture or dislocation.      Mild to moderate knee joint effusion.      DX-KNEE COMPLETE 4+ RIGHT   Final Result         Moderate knee joint effusion.      No fracture is seen.        The radiologist's interpretation of all radiological studies have been reviewed by me.    COURSE & MEDICAL DECISION MAKING  Nursing notes, VS, PMSFHx reviewed in chart.      5:37 AM - Patient seen and examined at bedside. I discussed that we will need to obtain imaging to further evaluate her pain. Patient will be treated with Zofran 4 mg and Hydromorphone 0.5 mg. Ordered right knee CT, right knee x-ray, CBC, BMP, APTT, and PTT to evaluate her symptoms. The differential diagnoses include but are not limited to: Fracture vs soft tissue injury.     7:32 AM - I informed the patient that her CT and x-ray were normal besides moderate joint effusion. She will be fitted with crutches and a knee immobilizer. Additionally I prescribed Norco 5-325 mg for pain. Provided her with information to follow up with Ortho. Patient will be discharged at this time. She verbalizes agreement with discharge and plan of care.       I reviewed prescription monitoring program for patient's narcotic use before prescribing a scheduled drug.The patient will not drink alcohol nor drive with prescribed medications.} The patient will return for new or worsening symptoms and is stable at the time of discharge.    In prescribing controlled substances to this patient, I certify that I have obtained and reviewed the medical history of Alida Sofia Filiberto. I have also made a good matt effort to obtain applicable records from other providers who have treated the patient and records did not demonstrate any increased risk of substance  abuse that would prevent me from prescribing controlled substances.     I have conducted a physical exam and documented it. I have reviewed Ms. De Los Santos’s prescription history as maintained by the [place] Prescription Monitoring Program.     I have assessed the patient’s risk for abuse, dependency, and addiction using the validated Opioid Risk Tool available at https://www.mdcalc.com/jcuwqa-xfcz-cran-ort-narcotic-abuse.     Given the above, I believe the benefits of controlled substance therapy outweigh the risks. The reasons for prescribing controlled substances include non-narcotic, oral analgesic alternatives have been inadequate for pain control. Accordingly, I have discussed the risk and benefits, treatment plan, and alternative therapies with the patient.       DISPOSITION:  Patient will be discharged home in stable condition.    FOLLOW UP:  [facility], Emergency Dept  [address] [facility] [address] [phone]    If symptoms worsen    Chuyita De Souza M.D.  [address] [facility] [id number] [phone]    Schedule an appointment as soon as possible for a visit   orthopedic specialist on call      OUTPATIENT MEDICATIONS:  New Prescriptions    HYDROCODONE-ACETAMINOPHEN (NORCO) 5-325 MG TAB PER TABLET    Take 1-2 Tabs by mouth every four hours as needed for up to 5 days.       FINAL IMPRESSION  1. Fall, initial encounter    2. Knee effusion, right    3. Injury of right knee, initial encounter          Myrtle KHAN (Niesha), am scribing for, and in the presence of, Jonah Sierra M.D..    Electronically signed by: Myrtle Goldberg), 12/29/2019    Jonah KHAN M.D. personally performed the services described in this documentation, as scribed by Myrtle Cardoso in my presence, and it is both accurate and complete. C    The note accurately reflects work and decisions made by me.  Jonah Sierra  12/29/2019  11:21 AM

## 2019-12-29 NOTE — ED TRIAGE NOTES
"Chief Complaint   Patient presents with   • T-5000 GLF   • Knee Injury     right, unable to bear weight, swollen        Pt presents to ed via wheelchair for R knee pain after tripping and falling yesterday evening.  Pt reports shooting knee pain up thigh and down into leg.  Obvious swelling noted. Pt unable to bear weight.    Pt placed back in lobby.  Informed of triage process and wait times, thanked for patience.  Pt instructed to notify staff of any symptom changes.    /90   Pulse (!) 119   Temp 36.8 °C (98.2 °F) (Temporal)   Resp 18   Ht 1.702 m (5' 7\")   Wt 89.4 kg (197 lb)   SpO2 98%   BMI 30.85 kg/m²    "

## 2020-05-19 ENCOUNTER — OFFICE VISIT (OUTPATIENT)
Dept: MEDICAL GROUP | Facility: MEDICAL CENTER | Age: 60
End: 2020-05-19

## 2020-05-19 VITALS
HEIGHT: 67 IN | TEMPERATURE: 97.7 F | BODY MASS INDEX: 31.63 KG/M2 | WEIGHT: 201.5 LBS | HEART RATE: 98 BPM | DIASTOLIC BLOOD PRESSURE: 68 MMHG | SYSTOLIC BLOOD PRESSURE: 138 MMHG | OXYGEN SATURATION: 91 % | RESPIRATION RATE: 16 BRPM

## 2020-05-19 DIAGNOSIS — E89.0 POSTABLATIVE HYPOTHYROIDISM: ICD-10-CM

## 2020-05-19 DIAGNOSIS — K21.9 GASTROESOPHAGEAL REFLUX DISEASE WITHOUT ESOPHAGITIS: ICD-10-CM

## 2020-05-19 PROCEDURE — 99214 OFFICE O/P EST MOD 30 MIN: CPT | Performed by: FAMILY MEDICINE

## 2020-05-19 RX ORDER — OMEPRAZOLE 20 MG/1
20 CAPSULE, DELAYED RELEASE ORAL DAILY
Qty: 180 CAP | Refills: 3 | Status: SHIPPED | OUTPATIENT
Start: 2020-05-19 | End: 2021-10-12

## 2020-05-19 RX ORDER — OMEPRAZOLE 20 MG/1
20 CAPSULE, DELAYED RELEASE ORAL DAILY
COMMUNITY
End: 2020-05-19 | Stop reason: SDUPTHER

## 2020-05-19 RX ORDER — LEVOTHYROXINE SODIUM 175 UG/1
175 TABLET ORAL
COMMUNITY
End: 2021-10-12

## 2020-05-19 RX ORDER — LEVOTHYROXINE SODIUM 175 UG/1
TABLET ORAL
Qty: 64 TAB | Refills: 3 | Status: SHIPPED | OUTPATIENT
Start: 2020-05-19 | End: 2021-08-05 | Stop reason: SDUPTHER

## 2020-05-19 RX ORDER — LEVOTHYROXINE SODIUM 0.15 MG/1
TABLET ORAL
Qty: 45 TAB | Refills: 3 | Status: SHIPPED | OUTPATIENT
Start: 2020-05-19 | End: 2021-08-05 | Stop reason: SDUPTHER

## 2020-05-19 SDOH — HEALTH STABILITY: MENTAL HEALTH: HOW OFTEN DO YOU HAVE A DRINK CONTAINING ALCOHOL?: MONTHLY OR LESS

## 2020-05-19 SDOH — HEALTH STABILITY: MENTAL HEALTH: HOW MANY STANDARD DRINKS CONTAINING ALCOHOL DO YOU HAVE ON A TYPICAL DAY?: 1 OR 2

## 2020-05-19 ASSESSMENT — PATIENT HEALTH QUESTIONNAIRE - PHQ9: CLINICAL INTERPRETATION OF PHQ2 SCORE: 0

## 2020-05-19 NOTE — PROGRESS NOTES
CC: Post ablative hypothyroidism, acid reflux    HPI:  Alida presents today to establish new PCP.    Patient has been active and independent with all ADLs.  Just moved from [place].  She does not have insurance she is self-pay.    Postablative hypothyroidism  Patient has history of Graves' disease status post radiation therapy after she developed hypothyroidism.  Her most recent TSH was low(less than 0.1).  Patient has been on levothyroxine 175 mcg daily.  Patient is reluctant to decrease the dose stated that in the past she was on 125 mcg and that causes a lot of problems, so it was increased to 175 mcg.He has been tolerating Levothyroxine, no palpitation, no cold or heat intolerance.    Gastroesophageal reflux disease without esophagitis  Patient has history of surgical repair ( fundoplication).  Patient has been doing fine on Prilosec 20 mg twice a day.  Requested medication refill.      Patient Active Problem List    Diagnosis Date Noted   • Postablative hypothyroidism 05/19/2020   • Gastroesophageal reflux disease without esophagitis 05/19/2020       Current Outpatient Medications   Medication Sig Dispense Refill   • levothyroxine (SYNTHROID) 175 MCG Tab Take 175 mcg by mouth Every morning on an empty stomach.     • levothyroxine (SYNTHROID) 150 MCG Tab 3 times a week( fri to sunday) 45 Tab 3   • levothyroxine (SYNTHROID) 175 MCG Tab Take 4 times a week ( Mon- Thursday) 64 Tab 3   • omeprazole (PRILOSEC) 20 MG delayed-release capsule Take 1 Cap by mouth every day. 180 Cap 3     No current facility-administered medications for this visit.          Allergies as of 05/19/2020 - Reviewed 12/29/2019   Allergen Reaction Noted   • Codeine  09/14/2013   • Nexium  09/14/2013        Social History     Socioeconomic History   • Marital status:      Spouse name: Not on file   • Number of children: Not on file   • Years of education: Not on file   • Highest education level: Not on file   Occupational History   • Not  "on file   Social Needs   • Financial resource strain: Not on file   • Food insecurity     Worry: Not on file     Inability: Not on file   • Transportation needs     Medical: Not on file     Non-medical: Not on file   Tobacco Use   • Smoking status: Never Smoker   • Smokeless tobacco: Never Used   Substance and Sexual Activity   • Alcohol use: Yes     Alcohol/week: 4.2 oz     Types: 7 Standard drinks or equivalent per week     Frequency: Monthly or less     Drinks per session: 1 or 2     Comment: one a day    • Drug use: No   • Sexual activity: Not on file   Lifestyle   • Physical activity     Days per week: Not on file     Minutes per session: Not on file   • Stress: Not on file   Relationships   • Social connections     Talks on phone: Not on file     Gets together: Not on file     Attends [employer] service: Not on file     Active member of club or organization: Not on file     Attends meetings of clubs or organizations: Not on file     Relationship status: Not on file   • Intimate partner violence     Fear of current or ex partner: Not on file     Emotionally abused: Not on file     Physically abused: Not on file     Forced sexual activity: Not on file   Other Topics Concern   • Not on file   Social History Narrative   • Not on file       Family History   Problem Relation Age of Onset   • Non-contributory Mother    • Non-contributory Father        History reviewed. No pertinent surgical history.    ROS:  Denies any Headache, Blurred Vision, Confusion Chest pain,  Shortness of breath,  Abdominal pain, Changes of bowel or bladder, Lower ext edema, Fevers, Nights sweats, Weight Changes, Focal weakness or numbness.  All other systems are negative.    /68 (BP Location: Right arm, Patient Position: Sitting, BP Cuff Size: Adult)   Pulse 98   Temp 36.5 °C (97.7 °F) (Temporal)   Resp 16   Ht 1.702 m (5' 7\")   Wt 91.4 kg (201 lb 8 oz)   SpO2 91%   BMI 31.56 kg/m²     Physical Exam:  Gen:         Alert and " oriented, No apparent distress.  HEENT:   Perrla, TM clear,  Oralpharynx no erythema or exudates.  Neck:       No Jugular venous distension, Lymphadenopathy, Thyromegaly, Bruits.  Lungs:     Clear to auscultation bilaterally  CV:          Regular rate and rhythm. No murmurs, rubs or gallops.  Abd:         Soft non tender, non distended. Normal active bowel sounds. No                                        Hepatosplenomegaly, No pulsatile masses.  Ext:          No clubbing, cyanosis, edema.      Assessment and Plan.   60 y.o. female     1. Postablative hypothyroidism  Patient has history of Graves' disease status post radiation therapy.  Most recent TSH was low(less than 0.1).  Patient has been on levothyroxine 175 mcg daily.  Patient is reluctant to decrease the dose stated that in the past she was on 125 mcg and that causes a lot of problems, so it was increased to 175 mcg.  However I will decrease the dose to: Levothyroxine 175 mcg Monday to Thursday, and levothyroxine 150 mcg Friday through Sunday.  Will repeat TSH in 6 weeks.    - levothyroxine (SYNTHROID) 150 MCG Tab; 3 times a week( fri to sunday)  Dispense: 45 Tab; Refill: 3  - levothyroxine (SYNTHROID) 175 MCG Tab; Take 4 times a week ( Mon- Thursday)  Dispense: 64 Tab; Refill: 3  - TSH WITH REFLEX TO FT4; Future    2. Gastroesophageal reflux disease without esophagitis  Has history of surgical repair ( fundoplication).  Patient has been doing fine on Prilosec 20 mg twice a day.  Requested medication refill.    - omeprazole (PRILOSEC) 20 MG delayed-release capsule; Take 1 Cap by mouth every day.  Dispense: 180 Cap; Refill: 3

## 2021-03-15 DIAGNOSIS — Z23 NEED FOR VACCINATION: ICD-10-CM

## 2021-08-05 ENCOUNTER — PATIENT MESSAGE (OUTPATIENT)
Dept: MEDICAL GROUP | Facility: MEDICAL CENTER | Age: 61
End: 2021-08-05

## 2021-08-05 ENCOUNTER — TELEPHONE (OUTPATIENT)
Dept: MEDICAL GROUP | Facility: MEDICAL CENTER | Age: 61
End: 2021-08-05

## 2021-08-05 DIAGNOSIS — E89.0 POSTABLATIVE HYPOTHYROIDISM: ICD-10-CM

## 2021-08-05 RX ORDER — LEVOTHYROXINE SODIUM 0.15 MG/1
TABLET ORAL
Qty: 45 TABLET | Refills: 0 | Status: SHIPPED | OUTPATIENT
Start: 2021-08-05 | End: 2021-10-12

## 2021-08-05 RX ORDER — LEVOTHYROXINE SODIUM 175 UG/1
TABLET ORAL
Qty: 64 TABLET | Refills: 0 | Status: SHIPPED | OUTPATIENT
Start: 2021-08-05 | End: 2021-08-05 | Stop reason: SDUPTHER

## 2021-08-05 RX ORDER — LEVOTHYROXINE SODIUM 0.15 MG/1
TABLET ORAL
Qty: 45 TABLET | Refills: 0 | Status: SHIPPED | OUTPATIENT
Start: 2021-08-05 | End: 2021-08-05 | Stop reason: SDUPTHER

## 2021-08-05 RX ORDER — LEVOTHYROXINE SODIUM 175 UG/1
TABLET ORAL
Qty: 64 TABLET | Refills: 0 | Status: SHIPPED | OUTPATIENT
Start: 2021-08-05 | End: 2021-10-12

## 2021-08-05 NOTE — TELEPHONE ENCOUNTER
Patient is going to schedule an appt and would like her labs drawn before her appt. She is requesting her thyroid and CBC

## 2021-08-06 NOTE — TELEPHONE ENCOUNTER
From: Alida De Los Santos  To: Physician Lisa Hernandez  Sent: 8/5/2021 5:13 PM PDT  Subject: Prescription Question    I am out of both my dosages of Synthroid I need a month until  is back in office. Also I need lab request for TSH and CBC before I see doctor.    Thank you

## 2021-10-12 ENCOUNTER — OFFICE VISIT (OUTPATIENT)
Dept: MEDICAL GROUP | Facility: MEDICAL CENTER | Age: 61
End: 2021-10-12

## 2021-10-12 VITALS
RESPIRATION RATE: 16 BRPM | DIASTOLIC BLOOD PRESSURE: 76 MMHG | HEIGHT: 67 IN | OXYGEN SATURATION: 95 % | TEMPERATURE: 97.6 F | SYSTOLIC BLOOD PRESSURE: 132 MMHG | WEIGHT: 190 LBS | HEART RATE: 64 BPM | BODY MASS INDEX: 29.82 KG/M2

## 2021-10-12 DIAGNOSIS — F10.20 UNCOMPLICATED ALCOHOL DEPENDENCE (HCC): ICD-10-CM

## 2021-10-12 DIAGNOSIS — E89.0 POSTABLATIVE HYPOTHYROIDISM: ICD-10-CM

## 2021-10-12 DIAGNOSIS — K21.9 GASTROESOPHAGEAL REFLUX DISEASE WITHOUT ESOPHAGITIS: ICD-10-CM

## 2021-10-12 DIAGNOSIS — F41.9 ANXIETY: ICD-10-CM

## 2021-10-12 PROCEDURE — 99214 OFFICE O/P EST MOD 30 MIN: CPT | Performed by: FAMILY MEDICINE

## 2021-10-12 RX ORDER — OMEPRAZOLE 10 MG/1
10 CAPSULE, DELAYED RELEASE ORAL 2 TIMES DAILY
Qty: 180 CAPSULE | Refills: 3 | Status: SHIPPED | OUTPATIENT
Start: 2021-10-12

## 2021-10-12 RX ORDER — LEVOTHYROXINE SODIUM 150 MCG
150 TABLET ORAL
Qty: 90 TABLET | Refills: 0 | Status: SHIPPED | OUTPATIENT
Start: 2021-10-12 | End: 2022-02-01 | Stop reason: SDUPTHER

## 2021-10-12 RX ORDER — BUSPIRONE HYDROCHLORIDE 10 MG/1
10 TABLET ORAL 2 TIMES DAILY
Qty: 60 TABLET | Refills: 0 | Status: SHIPPED | OUTPATIENT
Start: 2021-10-12

## 2021-10-12 ASSESSMENT — PATIENT HEALTH QUESTIONNAIRE - PHQ9: CLINICAL INTERPRETATION OF PHQ2 SCORE: 0

## 2021-10-12 NOTE — PROGRESS NOTES
"  Subjective:     Alida De Los Santos is a 61 y.o. female presenting to establish care.  Reports a history of Graves' disease, underwent radioactive iodine treatment.  Is currently on brand-name Synthroid 150 mg 3 times a week, 175 mcg 4 times a week.  She recently did labs, TSH was less than 0.1.  She was told by her endocrinologist in the past that that was her goal.  She reports gaining a significant amount of weight when her dose is lowered.  She does notice a fullness at the base of her neck/upper chest area.  She currently does not have health insurance, testing and medication cost she pays out-of-pocket.  She also reports a history of GERD, currently well controlled on omeprazole 10 mg twice a day.  She does have anxiety.  Has about 2-3 drinks of alcohol most days of the week.  She has severe panic attacks at night.  She reports trying Zoloft, Celexa, Lexapro, Wellbutrin, Effexor, Paxil, hydroxyzine in the past.  Prozac did seem to help, but needed higher doses and was gradually ineffective.  She also tried counseling/therapy.      Current Outpatient Medications:   •  SYNTHROID 150 MCG Tab, Take 1 Tablet by mouth every morning on an empty stomach., Disp: 90 Tablet, Rfl: 0  •  omeprazole (PRILOSEC) 10 MG CAPSULE DELAYED RELEASE, Take 1 Capsule by mouth 2 times a day., Disp: 180 Capsule, Rfl: 3  •  busPIRone (BUSPAR) 10 MG Tab tablet, Take 1 Tablet by mouth 2 times a day., Disp: 60 Tablet, Rfl: 0    Objective:     Vitals: /76   Pulse 64   Temp 36.4 °C (97.6 °F)   Resp 16   Ht 1.702 m (5' 7\")   Wt 86.2 kg (190 lb)   SpO2 95%   BMI 29.76 kg/m²   General: Alert  HEENT: Normocephalic.   Heart: Regular rate and rhythm.  S1 and S2 normal.  No murmurs appreciated.  Respiratory: Normal respiratory effort.  Clear to auscultation bilaterally.  Abdomen: Non-distended, soft  Extremities: No leg edema.    Assessment/Plan:     Alida was seen today for [facility].    Diagnoses and all orders for this " visit:    Postablative hypothyroidism  Chronic, overcontrolled. Recommended reducing her dose to 150mcg daily. Will recheck a tsh in 2 months.  Also recommended other clinics in the area like [facility] that may have more resources with her lack of insurance.   -     SYNTHROID 150 MCG Tab; Take 1 Tablet by mouth every morning on an empty stomach.  -     TSH; Future    Gastroesophageal reflux disease without esophagitis  Chronic, stable, continue omeprazole  -     omeprazole (PRILOSEC) 10 MG CAPSULE DELAYED RELEASE; Take 1 Capsule by mouth 2 times a day.    Anxiety  Chronic, stable. Discussed medication options, will try buspar  -     busPIRone (BUSPAR) 10 MG Tab tablet; Take 1 Tablet by mouth 2 times a day.    Uncomplicated alcohol dependence (HCC)  Chronic, stable, advised to cut down        Return in about 1 year (around 10/12/2022) for Med check.

## 2022-02-01 DIAGNOSIS — E89.0 POSTABLATIVE HYPOTHYROIDISM: ICD-10-CM

## 2022-02-01 RX ORDER — LEVOTHYROXINE SODIUM 150 MCG
150 TABLET ORAL
Qty: 90 TABLET | Refills: 1 | Status: SHIPPED | OUTPATIENT
Start: 2022-02-01

## 2023-09-14 ENCOUNTER — DOCUMENTATION (OUTPATIENT)
Dept: HEALTH INFORMATION MANAGEMENT | Facility: OTHER | Age: 63
End: 2023-09-14
Payer: COMMERCIAL

## 2023-11-20 ENCOUNTER — TELEPHONE (OUTPATIENT)
Dept: HEALTH INFORMATION MANAGEMENT | Facility: OTHER | Age: 63
End: 2023-11-20
Payer: COMMERCIAL